# Patient Record
Sex: FEMALE | Race: WHITE | NOT HISPANIC OR LATINO | Employment: OTHER | URBAN - METROPOLITAN AREA
[De-identification: names, ages, dates, MRNs, and addresses within clinical notes are randomized per-mention and may not be internally consistent; named-entity substitution may affect disease eponyms.]

---

## 2023-02-21 LAB
ALANINE AMINOTRANSFERASE (SGPT) (U/L) IN SER/PLAS: 9 U/L (ref 7–45)
ALBUMIN (G/DL) IN SER/PLAS: 4.2 G/DL (ref 3.4–5)
ALKALINE PHOSPHATASE (U/L) IN SER/PLAS: 70 U/L (ref 33–136)
ANION GAP IN SER/PLAS: 14 MMOL/L (ref 10–20)
ASPARTATE AMINOTRANSFERASE (SGOT) (U/L) IN SER/PLAS: 15 U/L (ref 9–39)
BILIRUBIN TOTAL (MG/DL) IN SER/PLAS: 0.6 MG/DL (ref 0–1.2)
CALCIUM (MG/DL) IN SER/PLAS: 9.4 MG/DL (ref 8.6–10.3)
CARBON DIOXIDE, TOTAL (MMOL/L) IN SER/PLAS: 28 MMOL/L (ref 21–32)
CHLORIDE (MMOL/L) IN SER/PLAS: 95 MMOL/L (ref 98–107)
CHOLESTEROL (MG/DL) IN SER/PLAS: 213 MG/DL (ref 0–199)
CHOLESTEROL IN HDL (MG/DL) IN SER/PLAS: 75.1 MG/DL
CHOLESTEROL/HDL RATIO: 2.8
CREATININE (MG/DL) IN SER/PLAS: 0.65 MG/DL (ref 0.5–1.05)
ERYTHROCYTE DISTRIBUTION WIDTH (RATIO) BY AUTOMATED COUNT: 13.4 % (ref 11.5–14.5)
ERYTHROCYTE MEAN CORPUSCULAR HEMOGLOBIN CONCENTRATION (G/DL) BY AUTOMATED: 32.2 G/DL (ref 32–36)
ERYTHROCYTE MEAN CORPUSCULAR VOLUME (FL) BY AUTOMATED COUNT: 91 FL (ref 80–100)
ERYTHROCYTES (10*6/UL) IN BLOOD BY AUTOMATED COUNT: 4.06 X10E12/L (ref 4–5.2)
GFR FEMALE: >90 ML/MIN/1.73M2
GLUCOSE (MG/DL) IN SER/PLAS: 82 MG/DL (ref 74–99)
HEMATOCRIT (%) IN BLOOD BY AUTOMATED COUNT: 36.9 % (ref 36–46)
HEMOGLOBIN (G/DL) IN BLOOD: 11.9 G/DL (ref 12–16)
LDL: 125 MG/DL (ref 0–99)
LEUKOCYTES (10*3/UL) IN BLOOD BY AUTOMATED COUNT: 7.1 X10E9/L (ref 4.4–11.3)
PLATELETS (10*3/UL) IN BLOOD AUTOMATED COUNT: 416 X10E9/L (ref 150–450)
POTASSIUM (MMOL/L) IN SER/PLAS: 4.1 MMOL/L (ref 3.5–5.3)
PROTEIN TOTAL: 7.4 G/DL (ref 6.4–8.2)
SODIUM (MMOL/L) IN SER/PLAS: 133 MMOL/L (ref 136–145)
THYROTROPIN (MIU/L) IN SER/PLAS BY DETECTION LIMIT <= 0.05 MIU/L: 1.21 MIU/L (ref 0.44–3.98)
TRIGLYCERIDE (MG/DL) IN SER/PLAS: 66 MG/DL (ref 0–149)
UREA NITROGEN (MG/DL) IN SER/PLAS: 16 MG/DL (ref 6–23)
VLDL: 13 MG/DL (ref 0–40)

## 2023-04-05 ENCOUNTER — HOSPITAL ENCOUNTER (OUTPATIENT)
Dept: DATA CONVERSION | Facility: HOSPITAL | Age: 73
End: 2023-04-06
Attending: ORTHOPAEDIC SURGERY | Admitting: ORTHOPAEDIC SURGERY
Payer: MEDICARE

## 2023-04-05 DIAGNOSIS — Z79.891 LONG TERM (CURRENT) USE OF OPIATE ANALGESIC: ICD-10-CM

## 2023-04-05 DIAGNOSIS — Z79.82 LONG TERM (CURRENT) USE OF ASPIRIN: ICD-10-CM

## 2023-04-05 DIAGNOSIS — M17.12 UNILATERAL PRIMARY OSTEOARTHRITIS, LEFT KNEE: ICD-10-CM

## 2023-04-05 DIAGNOSIS — M16.11 UNILATERAL PRIMARY OSTEOARTHRITIS, RIGHT HIP: ICD-10-CM

## 2023-04-05 DIAGNOSIS — I73.9 PERIPHERAL VASCULAR DISEASE, UNSPECIFIED (CMS-HCC): ICD-10-CM

## 2023-04-05 DIAGNOSIS — Z79.1 LONG TERM (CURRENT) USE OF NON-STEROIDAL ANTI-INFLAMMATORIES (NSAID): ICD-10-CM

## 2023-04-05 DIAGNOSIS — K21.9 GASTRO-ESOPHAGEAL REFLUX DISEASE WITHOUT ESOPHAGITIS: ICD-10-CM

## 2023-04-05 DIAGNOSIS — I10 ESSENTIAL (PRIMARY) HYPERTENSION: ICD-10-CM

## 2023-04-05 DIAGNOSIS — D50.9 IRON DEFICIENCY ANEMIA, UNSPECIFIED: ICD-10-CM

## 2023-04-05 DIAGNOSIS — E03.9 HYPOTHYROIDISM, UNSPECIFIED: ICD-10-CM

## 2023-04-05 DIAGNOSIS — Z98.51 TUBAL LIGATION STATUS: ICD-10-CM

## 2023-04-05 DIAGNOSIS — E78.5 HYPERLIPIDEMIA, UNSPECIFIED: ICD-10-CM

## 2023-04-05 LAB
ABO GROUP (TYPE) IN BLOOD: NORMAL
ANTIBODY SCREEN: NORMAL
RH FACTOR: NORMAL
SARS-COV-2 RESULT: NOT DETECTED

## 2023-05-31 ENCOUNTER — HOSPITAL ENCOUNTER (OUTPATIENT)
Dept: DATA CONVERSION | Facility: HOSPITAL | Age: 73
End: 2023-06-01
Attending: ORTHOPAEDIC SURGERY | Admitting: ORTHOPAEDIC SURGERY
Payer: MEDICARE

## 2023-05-31 DIAGNOSIS — M87.051 IDIOPATHIC ASEPTIC NECROSIS OF RIGHT FEMUR (MULTI): ICD-10-CM

## 2023-05-31 DIAGNOSIS — M19.90 UNSPECIFIED OSTEOARTHRITIS, UNSPECIFIED SITE: ICD-10-CM

## 2023-05-31 DIAGNOSIS — M25.751 OSTEOPHYTE, RIGHT HIP: ICD-10-CM

## 2023-05-31 DIAGNOSIS — Z79.82 LONG TERM (CURRENT) USE OF ASPIRIN: ICD-10-CM

## 2023-05-31 DIAGNOSIS — I10 ESSENTIAL (PRIMARY) HYPERTENSION: ICD-10-CM

## 2023-05-31 DIAGNOSIS — D50.9 IRON DEFICIENCY ANEMIA, UNSPECIFIED: ICD-10-CM

## 2023-05-31 DIAGNOSIS — I73.9 PERIPHERAL VASCULAR DISEASE, UNSPECIFIED (CMS-HCC): ICD-10-CM

## 2023-05-31 DIAGNOSIS — Z86.19 PERSONAL HISTORY OF OTHER INFECTIOUS AND PARASITIC DISEASES: ICD-10-CM

## 2023-05-31 DIAGNOSIS — Z96.652 PRESENCE OF LEFT ARTIFICIAL KNEE JOINT: ICD-10-CM

## 2023-05-31 DIAGNOSIS — E78.5 HYPERLIPIDEMIA, UNSPECIFIED: ICD-10-CM

## 2023-05-31 DIAGNOSIS — E03.9 HYPOTHYROIDISM, UNSPECIFIED: ICD-10-CM

## 2023-06-27 ENCOUNTER — TELEPHONE (OUTPATIENT)
Dept: PRIMARY CARE | Facility: CLINIC | Age: 73
End: 2023-06-27
Payer: MEDICARE

## 2023-06-27 NOTE — TELEPHONE ENCOUNTER
They called to let us know that her BP has been running 172/97 hr 58 normal rhythm at 10:10 and then 10:15 it was 174/94 and hr 59 normal rhythm they stated that she is not on any meds for her BP and she is not schedule til 8/14 to see vanessa and she had two surgery one was hip & back she in pas has ran like 140/86 6/22, 6/20 150/86 no chest pain , sob, no weakness, and is A system matic home physcial with [facility] and her was jaleesa [phone] this is her number and patient number is [phone]. What would you remanded to do?

## 2023-07-11 ENCOUNTER — LAB (OUTPATIENT)
Dept: LAB | Facility: LAB | Age: 73
End: 2023-07-11
Payer: MEDICARE

## 2023-07-11 ENCOUNTER — OFFICE VISIT (OUTPATIENT)
Dept: PRIMARY CARE | Facility: CLINIC | Age: 73
End: 2023-07-11
Payer: MEDICARE

## 2023-07-11 VITALS
DIASTOLIC BLOOD PRESSURE: 78 MMHG | WEIGHT: 106.4 LBS | SYSTOLIC BLOOD PRESSURE: 138 MMHG | HEIGHT: 62 IN | HEART RATE: 76 BPM | BODY MASS INDEX: 19.58 KG/M2 | OXYGEN SATURATION: 98 % | RESPIRATION RATE: 16 BRPM

## 2023-07-11 DIAGNOSIS — E55.9 VITAMIN D DEFICIENCY: ICD-10-CM

## 2023-07-11 DIAGNOSIS — E53.8 VITAMIN B 12 DEFICIENCY: ICD-10-CM

## 2023-07-11 DIAGNOSIS — E78.5 DYSLIPIDEMIA: ICD-10-CM

## 2023-07-11 DIAGNOSIS — I10 PRIMARY HYPERTENSION: ICD-10-CM

## 2023-07-11 DIAGNOSIS — R73.01 ELEVATED FASTING BLOOD SUGAR: ICD-10-CM

## 2023-07-11 DIAGNOSIS — E03.9 HYPOTHYROIDISM, UNSPECIFIED TYPE: ICD-10-CM

## 2023-07-11 DIAGNOSIS — E03.9 HYPOTHYROIDISM, UNSPECIFIED TYPE: Primary | ICD-10-CM

## 2023-07-11 DIAGNOSIS — R03.0 ELEVATED BLOOD PRESSURE, SITUATIONAL: ICD-10-CM

## 2023-07-11 PROBLEM — I73.9 PVD (PERIPHERAL VASCULAR DISEASE) (CMS-HCC): Status: ACTIVE | Noted: 2023-07-11

## 2023-07-11 PROBLEM — M25.562 LEFT KNEE PAIN: Status: ACTIVE | Noted: 2023-07-11

## 2023-07-11 PROBLEM — R12 HEARTBURN: Status: ACTIVE | Noted: 2023-07-11

## 2023-07-11 PROBLEM — R29.898 DECREASED STRENGTH INVOLVING KNEE JOINT: Status: ACTIVE | Noted: 2023-07-11

## 2023-07-11 PROBLEM — M25.662 DECREASED RANGE OF MOTION OF LEFT KNEE: Status: ACTIVE | Noted: 2023-07-11

## 2023-07-11 PROBLEM — I83.899 VARICOSE VEINS WITH SWELLING: Status: ACTIVE | Noted: 2023-07-11

## 2023-07-11 PROBLEM — R19.5 POSITIVE COLORECTAL CANCER SCREENING USING COLOGUARD TEST: Status: ACTIVE | Noted: 2023-07-11

## 2023-07-11 PROBLEM — R19.4 CHANGE IN BOWEL HABITS: Status: ACTIVE | Noted: 2023-07-11

## 2023-07-11 LAB
ALANINE AMINOTRANSFERASE (SGPT) (U/L) IN SER/PLAS: 9 U/L (ref 7–45)
ALBUMIN (G/DL) IN SER/PLAS: 4.2 G/DL (ref 3.4–5)
ALKALINE PHOSPHATASE (U/L) IN SER/PLAS: 83 U/L (ref 33–136)
ANION GAP IN SER/PLAS: 14 MMOL/L (ref 10–20)
ASPARTATE AMINOTRANSFERASE (SGOT) (U/L) IN SER/PLAS: 11 U/L (ref 9–39)
BILIRUBIN TOTAL (MG/DL) IN SER/PLAS: 0.5 MG/DL (ref 0–1.2)
CALCIDIOL (25 OH VITAMIN D3) (NG/ML) IN SER/PLAS: 54 NG/ML
CALCIUM (MG/DL) IN SER/PLAS: 9 MG/DL (ref 8.6–10.3)
CARBON DIOXIDE, TOTAL (MMOL/L) IN SER/PLAS: 27 MMOL/L (ref 21–32)
CHLORIDE (MMOL/L) IN SER/PLAS: 102 MMOL/L (ref 98–107)
CHOLESTEROL (MG/DL) IN SER/PLAS: 181 MG/DL (ref 0–199)
CHOLESTEROL IN HDL (MG/DL) IN SER/PLAS: 58 MG/DL
CHOLESTEROL/HDL RATIO: 3.1
COBALAMIN (VITAMIN B12) (PG/ML) IN SER/PLAS: 304 PG/ML (ref 211–911)
CREATININE (MG/DL) IN SER/PLAS: 0.68 MG/DL (ref 0.5–1.05)
ERYTHROCYTE DISTRIBUTION WIDTH (RATIO) BY AUTOMATED COUNT: 13.9 % (ref 11.5–14.5)
ERYTHROCYTE MEAN CORPUSCULAR HEMOGLOBIN CONCENTRATION (G/DL) BY AUTOMATED: 30.6 G/DL (ref 32–36)
ERYTHROCYTE MEAN CORPUSCULAR VOLUME (FL) BY AUTOMATED COUNT: 95 FL (ref 80–100)
ERYTHROCYTES (10*6/UL) IN BLOOD BY AUTOMATED COUNT: 3.76 X10E12/L (ref 4–5.2)
GFR FEMALE: >90 ML/MIN/1.73M2
GLUCOSE (MG/DL) IN SER/PLAS: 80 MG/DL (ref 74–99)
HEMATOCRIT (%) IN BLOOD BY AUTOMATED COUNT: 35.9 % (ref 36–46)
HEMOGLOBIN (G/DL) IN BLOOD: 11 G/DL (ref 12–16)
LDL: 105 MG/DL (ref 0–99)
LEUKOCYTES (10*3/UL) IN BLOOD BY AUTOMATED COUNT: 4.9 X10E9/L (ref 4.4–11.3)
PLATELETS (10*3/UL) IN BLOOD AUTOMATED COUNT: 356 X10E9/L (ref 150–450)
POTASSIUM (MMOL/L) IN SER/PLAS: 4.3 MMOL/L (ref 3.5–5.3)
PROTEIN TOTAL: 6.9 G/DL (ref 6.4–8.2)
SODIUM (MMOL/L) IN SER/PLAS: 139 MMOL/L (ref 136–145)
THYROTROPIN (MIU/L) IN SER/PLAS BY DETECTION LIMIT <= 0.05 MIU/L: 0.62 MIU/L (ref 0.44–3.98)
TRIGLYCERIDE (MG/DL) IN SER/PLAS: 91 MG/DL (ref 0–149)
UREA NITROGEN (MG/DL) IN SER/PLAS: 15 MG/DL (ref 6–23)
VLDL: 18 MG/DL (ref 0–40)

## 2023-07-11 PROCEDURE — 1159F MED LIST DOCD IN RCRD: CPT

## 2023-07-11 PROCEDURE — 36415 COLL VENOUS BLD VENIPUNCTURE: CPT

## 2023-07-11 PROCEDURE — 1160F RVW MEDS BY RX/DR IN RCRD: CPT

## 2023-07-11 PROCEDURE — 80053 COMPREHEN METABOLIC PANEL: CPT

## 2023-07-11 PROCEDURE — 84443 ASSAY THYROID STIM HORMONE: CPT

## 2023-07-11 PROCEDURE — 1036F TOBACCO NON-USER: CPT

## 2023-07-11 PROCEDURE — 85027 COMPLETE CBC AUTOMATED: CPT

## 2023-07-11 PROCEDURE — 3075F SYST BP GE 130 - 139MM HG: CPT

## 2023-07-11 PROCEDURE — 80061 LIPID PANEL: CPT

## 2023-07-11 PROCEDURE — 99213 OFFICE O/P EST LOW 20 MIN: CPT

## 2023-07-11 PROCEDURE — 82607 VITAMIN B-12: CPT

## 2023-07-11 PROCEDURE — 82306 VITAMIN D 25 HYDROXY: CPT

## 2023-07-11 PROCEDURE — 3078F DIAST BP <80 MM HG: CPT

## 2023-07-11 PROCEDURE — 83036 HEMOGLOBIN GLYCOSYLATED A1C: CPT

## 2023-07-11 RX ORDER — FERROUS SULFATE 325(65) MG
TABLET ORAL
COMMUNITY
End: 2023-08-14 | Stop reason: ALTCHOICE

## 2023-07-11 RX ORDER — LEVOTHYROXINE SODIUM 50 UG/1
50 TABLET ORAL DAILY
COMMUNITY
End: 2023-09-05 | Stop reason: SDUPTHER

## 2023-07-11 RX ORDER — PNV NO.95/FERROUS FUM/FOLIC AC 28MG-0.8MG
1 TABLET ORAL DAILY
COMMUNITY

## 2023-07-11 RX ORDER — CALCIUM CARBONATE 200(500)MG
1 TABLET,CHEWABLE ORAL DAILY
COMMUNITY

## 2023-07-11 RX ORDER — CHOLECALCIFEROL (VITAMIN D3) 25 MCG
1 TABLET ORAL DAILY
COMMUNITY

## 2023-07-11 RX ORDER — LISINOPRIL 10 MG/1
10 TABLET ORAL DAILY
Qty: 90 TABLET | Refills: 0 | Status: SHIPPED | OUTPATIENT
Start: 2023-07-11 | End: 2023-07-20 | Stop reason: SDUPTHER

## 2023-07-11 RX ORDER — PHENYLPROPANOLAMINE/CLEMASTINE 75-1.34MG
200 TABLET, EXTENDED RELEASE ORAL
COMMUNITY
End: 2023-08-14 | Stop reason: ALTCHOICE

## 2023-07-11 ASSESSMENT — ANXIETY QUESTIONNAIRES
5. BEING SO RESTLESS THAT IT IS HARD TO SIT STILL: NOT AT ALL
GAD7 TOTAL SCORE: 0
3. WORRYING TOO MUCH ABOUT DIFFERENT THINGS: NOT AT ALL
6. BECOMING EASILY ANNOYED OR IRRITABLE: NOT AT ALL
2. NOT BEING ABLE TO STOP OR CONTROL WORRYING: NOT AT ALL
IF YOU CHECKED OFF ANY PROBLEMS ON THIS QUESTIONNAIRE, HOW DIFFICULT HAVE THESE PROBLEMS MADE IT FOR YOU TO DO YOUR WORK, TAKE CARE OF THINGS AT HOME, OR GET ALONG WITH OTHER PEOPLE: NOT DIFFICULT AT ALL
4. TROUBLE RELAXING: NOT AT ALL
1. FEELING NERVOUS, ANXIOUS, OR ON EDGE: NOT AT ALL
7. FEELING AFRAID AS IF SOMETHING AWFUL MIGHT HAPPEN: NOT AT ALL

## 2023-07-11 ASSESSMENT — ENCOUNTER SYMPTOMS
ENDOCRINE NEGATIVE: 1
NEUROLOGICAL NEGATIVE: 1
GASTROINTESTINAL NEGATIVE: 1
CARDIOVASCULAR NEGATIVE: 1
LOSS OF SENSATION IN FEET: 0
OCCASIONAL FEELINGS OF UNSTEADINESS: 0
RESPIRATORY NEGATIVE: 1
EYES NEGATIVE: 1
CONSTITUTIONAL NEGATIVE: 1
MUSCULOSKELETAL NEGATIVE: 1
PSYCHIATRIC NEGATIVE: 1
ALLERGIC/IMMUNOLOGIC NEGATIVE: 1
DEPRESSION: 0
HEMATOLOGIC/LYMPHATIC NEGATIVE: 1

## 2023-07-11 ASSESSMENT — PATIENT HEALTH QUESTIONNAIRE - PHQ9
SUM OF ALL RESPONSES TO PHQ9 QUESTIONS 1 AND 2: 0
2. FEELING DOWN, DEPRESSED OR HOPELESS: NOT AT ALL
1. LITTLE INTEREST OR PLEASURE IN DOING THINGS: NOT AT ALL

## 2023-07-11 NOTE — PATIENT INSTRUCTIONS
Follow up with PCP at scheduled appointment.    Please complete blood work before PCP appointment.     Lisinopril 10mg daily for HTN.

## 2023-07-11 NOTE — PROGRESS NOTES
"Subjective   Patient ID: Oralia Fam is a 72 y.o. female who presents for Hypertension.    A 72 year old female arrives to the clinic with chief complaint of concern for elevated BP. The patient reports having elevated blood pressure with episodes of dizziness. She denies any falls or trauma. She was educated by her previous PCP to monitor her BP until she establishes with a new primary care provider however missed her last appointment.  She reports that her blood pressure in the office today is elevated.  She did bring a blood pressure reading log would like to review them.  Other than this, the patient denies any chest pain, shortness of breath, diarrhea, fevers, chills, head pain, COVID-like symptoms.         Review of Systems   Constitutional: Negative.    HENT: Negative.     Eyes: Negative.    Respiratory: Negative.     Cardiovascular: Negative.    Gastrointestinal: Negative.    Endocrine: Negative.    Genitourinary: Negative.    Musculoskeletal: Negative.    Skin: Negative.    Allergic/Immunologic: Negative.    Neurological: Negative.    Hematological: Negative.    Psychiatric/Behavioral: Negative.     All other systems reviewed and are negative.      Objective   /78   Pulse 76   Resp 16   Ht 1.575 m (5' 2\")   Wt 48.3 kg (106 lb 6.4 oz)   SpO2 98%   BMI 19.46 kg/m²     Physical Exam  Vitals and nursing note reviewed.   Constitutional:       Appearance: Normal appearance.   HENT:      Head: Normocephalic and atraumatic.      Right Ear: Tympanic membrane normal.      Left Ear: Tympanic membrane normal.      Nose: Nose normal.      Mouth/Throat:      Mouth: Mucous membranes are moist.      Pharynx: Oropharynx is clear.   Eyes:      Extraocular Movements: Extraocular movements intact.      Conjunctiva/sclera: Conjunctivae normal.      Pupils: Pupils are equal, round, and reactive to light.   Cardiovascular:      Rate and Rhythm: Normal rate and regular rhythm.   Pulmonary:      Effort: Pulmonary " effort is normal.      Breath sounds: Normal breath sounds.   Abdominal:      General: Bowel sounds are normal.      Palpations: Abdomen is soft.   Musculoskeletal:         General: Normal range of motion.      Cervical back: Normal range of motion and neck supple.   Skin:     General: Skin is warm.      Capillary Refill: Capillary refill takes less than 2 seconds.   Neurological:      General: No focal deficit present.      Mental Status: She is alert and oriented to person, place, and time. Mental status is at baseline.   Psychiatric:         Mood and Affect: Mood normal.         Behavior: Behavior normal.         Thought Content: Thought content normal.         Judgment: Judgment normal.         Assessment/Plan   Problem List Items Addressed This Visit       Dyslipidemia    Relevant Orders    Lipid Panel    Elevated blood pressure, situational    Relevant Orders    Comprehensive Metabolic Panel    CBC    Hypothyroid - Primary    Relevant Orders    TSH with reflex to Free T4 if abnormal    Primary hypertension     Other Visit Diagnoses       Vitamin B 12 deficiency        Relevant Orders    Vitamin B12    Vitamin D deficiency        Relevant Orders    Vitamin D, Total    Elevated fasting blood sugar        Relevant Orders    Hemoglobin A1C          Please complete blood work before PCP appointment.    Lisinopril 10mg daily for elevated BP. Bring new BP log to new PCP appointment. Call the office in 2 weeks for medication review.    I also advised you to follow low fat diet and exercise for at least 30 minutes daily.    Anticipatory guidance, age appropriate vaccines, screening exams, health promotion and prevention discussed.    This document was generated using the assistance of voice recognition software. If there are any errors of spelling, grammar, syntax, or meaning; please feel free to contact me directly for clarification.

## 2023-07-12 LAB
ESTIMATED AVERAGE GLUCOSE FOR HBA1C: 105 MG/DL
HEMOGLOBIN A1C/HEMOGLOBIN TOTAL IN BLOOD: 5.3 %

## 2023-07-20 ENCOUNTER — TELEPHONE (OUTPATIENT)
Dept: PRIMARY CARE | Facility: CLINIC | Age: 73
End: 2023-07-20
Payer: MEDICARE

## 2023-07-20 DIAGNOSIS — R42 VERTIGO: Primary | ICD-10-CM

## 2023-07-20 DIAGNOSIS — I10 PRIMARY HYPERTENSION: ICD-10-CM

## 2023-07-20 RX ORDER — MECLIZINE HYDROCHLORIDE 25 MG/1
25 TABLET ORAL 3 TIMES DAILY PRN
Qty: 30 TABLET | Refills: 11 | Status: SHIPPED | OUTPATIENT
Start: 2023-07-20 | End: 2023-08-14 | Stop reason: ALTCHOICE

## 2023-07-20 RX ORDER — LISINOPRIL 10 MG/1
10 TABLET ORAL DAILY
Qty: 90 TABLET | Refills: 0 | Status: SHIPPED | OUTPATIENT
Start: 2023-07-20 | End: 2023-10-09 | Stop reason: SDUPTHER

## 2023-07-20 NOTE — TELEPHONE ENCOUNTER
Meclizine sent for vertigo and dizziness.    Lisinopril 10mg working well for HTN.    Pt is satisfied. No need for call back. Thanks!    -ALEX

## 2023-07-20 NOTE — TELEPHONE ENCOUNTER
Recently put on Lisinopril 10 mg    She was to call you in a week & asking if she could speak to you  ([phone])

## 2023-08-08 PROBLEM — M17.12 PRIMARY OSTEOARTHRITIS OF LEFT KNEE: Status: ACTIVE | Noted: 2023-08-08

## 2023-08-08 PROBLEM — M25.551 RIGHT HIP PAIN: Status: ACTIVE | Noted: 2023-08-08

## 2023-08-08 PROBLEM — M87.051 AVASCULAR NECROSIS OF BONE OF RIGHT HIP (MULTI): Status: ACTIVE | Noted: 2023-08-08

## 2023-08-14 ENCOUNTER — OFFICE VISIT (OUTPATIENT)
Dept: PRIMARY CARE | Facility: CLINIC | Age: 73
End: 2023-08-14
Payer: MEDICARE

## 2023-08-14 VITALS
SYSTOLIC BLOOD PRESSURE: 126 MMHG | HEART RATE: 58 BPM | DIASTOLIC BLOOD PRESSURE: 80 MMHG | HEIGHT: 63 IN | WEIGHT: 110 LBS | BODY MASS INDEX: 19.49 KG/M2

## 2023-08-14 DIAGNOSIS — E78.5 DYSLIPIDEMIA: ICD-10-CM

## 2023-08-14 DIAGNOSIS — R60.0 EDEMA OF LEFT LOWER LEG DUE TO PERIPHERAL VENOUS INSUFFICIENCY: ICD-10-CM

## 2023-08-14 DIAGNOSIS — K21.9 GASTROESOPHAGEAL REFLUX DISEASE WITHOUT ESOPHAGITIS: ICD-10-CM

## 2023-08-14 DIAGNOSIS — Z96.652 HISTORY OF LEFT KNEE REPLACEMENT: ICD-10-CM

## 2023-08-14 DIAGNOSIS — H81.12 BPPV (BENIGN PAROXYSMAL POSITIONAL VERTIGO), LEFT: ICD-10-CM

## 2023-08-14 DIAGNOSIS — I87.2 CHRONIC VENOUS INSUFFICIENCY OF LOWER EXTREMITY: ICD-10-CM

## 2023-08-14 DIAGNOSIS — I10 PRIMARY HYPERTENSION: Primary | ICD-10-CM

## 2023-08-14 DIAGNOSIS — E03.9 ACQUIRED HYPOTHYROIDISM: ICD-10-CM

## 2023-08-14 DIAGNOSIS — Z96.641 HISTORY OF RIGHT HIP REPLACEMENT: ICD-10-CM

## 2023-08-14 DIAGNOSIS — I87.2 EDEMA OF LEFT LOWER LEG DUE TO PERIPHERAL VENOUS INSUFFICIENCY: ICD-10-CM

## 2023-08-14 PROBLEM — R12 HEARTBURN: Status: RESOLVED | Noted: 2023-07-11 | Resolved: 2023-08-14

## 2023-08-14 PROBLEM — M25.662 DECREASED RANGE OF MOTION OF LEFT KNEE: Status: RESOLVED | Noted: 2023-07-11 | Resolved: 2023-08-14

## 2023-08-14 PROBLEM — M87.051 AVASCULAR NECROSIS OF BONE OF RIGHT HIP (MULTI): Status: RESOLVED | Noted: 2023-08-08 | Resolved: 2023-08-14

## 2023-08-14 PROBLEM — M25.562 LEFT KNEE PAIN: Status: RESOLVED | Noted: 2023-07-11 | Resolved: 2023-08-14

## 2023-08-14 PROBLEM — R73.01 ABNORMAL FASTING GLUCOSE: Status: RESOLVED | Noted: 2023-07-11 | Resolved: 2023-08-14

## 2023-08-14 PROBLEM — R19.4 CHANGE IN BOWEL HABITS: Status: RESOLVED | Noted: 2023-07-11 | Resolved: 2023-08-14

## 2023-08-14 PROBLEM — R19.5 POSITIVE COLORECTAL CANCER SCREENING USING COLOGUARD TEST: Status: RESOLVED | Noted: 2023-07-11 | Resolved: 2023-08-14

## 2023-08-14 PROBLEM — M17.12 PRIMARY OSTEOARTHRITIS OF LEFT KNEE: Status: RESOLVED | Noted: 2023-08-08 | Resolved: 2023-08-14

## 2023-08-14 PROBLEM — R29.898 DECREASED STRENGTH INVOLVING KNEE JOINT: Status: RESOLVED | Noted: 2023-07-11 | Resolved: 2023-08-14

## 2023-08-14 PROBLEM — M25.551 RIGHT HIP PAIN: Status: RESOLVED | Noted: 2023-08-08 | Resolved: 2023-08-14

## 2023-08-14 PROBLEM — I83.899 VARICOSE VEINS WITH SWELLING: Status: RESOLVED | Noted: 2023-07-11 | Resolved: 2023-08-14

## 2023-08-14 PROBLEM — R03.0 ELEVATED BLOOD PRESSURE, SITUATIONAL: Status: RESOLVED | Noted: 2023-07-11 | Resolved: 2023-08-14

## 2023-08-14 PROCEDURE — 1159F MED LIST DOCD IN RCRD: CPT | Performed by: INTERNAL MEDICINE

## 2023-08-14 PROCEDURE — 3079F DIAST BP 80-89 MM HG: CPT | Performed by: INTERNAL MEDICINE

## 2023-08-14 PROCEDURE — 99214 OFFICE O/P EST MOD 30 MIN: CPT | Performed by: INTERNAL MEDICINE

## 2023-08-14 PROCEDURE — 3074F SYST BP LT 130 MM HG: CPT | Performed by: INTERNAL MEDICINE

## 2023-08-14 PROCEDURE — 1036F TOBACCO NON-USER: CPT | Performed by: INTERNAL MEDICINE

## 2023-08-14 PROCEDURE — 1160F RVW MEDS BY RX/DR IN RCRD: CPT | Performed by: INTERNAL MEDICINE

## 2023-08-14 RX ORDER — OMEPRAZOLE 20 MG/1
20 CAPSULE, DELAYED RELEASE ORAL DAILY
Qty: 30 CAPSULE | Refills: 5 | Status: SHIPPED | OUTPATIENT
Start: 2023-08-14 | End: 2024-02-14

## 2023-08-14 ASSESSMENT — ANXIETY QUESTIONNAIRES
5. BEING SO RESTLESS THAT IT IS HARD TO SIT STILL: NOT AT ALL
2. NOT BEING ABLE TO STOP OR CONTROL WORRYING: NOT AT ALL
1. FEELING NERVOUS, ANXIOUS, OR ON EDGE: NOT AT ALL
3. WORRYING TOO MUCH ABOUT DIFFERENT THINGS: NOT AT ALL
7. FEELING AFRAID AS IF SOMETHING AWFUL MIGHT HAPPEN: NOT AT ALL
GAD7 TOTAL SCORE: 0
IF YOU CHECKED OFF ANY PROBLEMS ON THIS QUESTIONNAIRE, HOW DIFFICULT HAVE THESE PROBLEMS MADE IT FOR YOU TO DO YOUR WORK, TAKE CARE OF THINGS AT HOME, OR GET ALONG WITH OTHER PEOPLE: NOT DIFFICULT AT ALL
4. TROUBLE RELAXING: NOT AT ALL
6. BECOMING EASILY ANNOYED OR IRRITABLE: NOT AT ALL

## 2023-08-14 ASSESSMENT — COLUMBIA-SUICIDE SEVERITY RATING SCALE - C-SSRS
2. HAVE YOU ACTUALLY HAD ANY THOUGHTS OF KILLING YOURSELF?: NO
1. IN THE PAST MONTH, HAVE YOU WISHED YOU WERE DEAD OR WISHED YOU COULD GO TO SLEEP AND NOT WAKE UP?: NO
6. HAVE YOU EVER DONE ANYTHING, STARTED TO DO ANYTHING, OR PREPARED TO DO ANYTHING TO END YOUR LIFE?: NO

## 2023-08-14 ASSESSMENT — PATIENT HEALTH QUESTIONNAIRE - PHQ9
SUM OF ALL RESPONSES TO PHQ9 QUESTIONS 1 AND 2: 0
1. LITTLE INTEREST OR PLEASURE IN DOING THINGS: NOT AT ALL
2. FEELING DOWN, DEPRESSED OR HOPELESS: NOT AT ALL

## 2023-08-14 ASSESSMENT — ENCOUNTER SYMPTOMS
OCCASIONAL FEELINGS OF UNSTEADINESS: 0
LOSS OF SENSATION IN FEET: 0
DEPRESSION: 0

## 2023-08-14 NOTE — PROGRESS NOTES
"Blood pressure controlled well on lisinopril 10 mg daily continue Subjective   Reason for Visit: Oralia Fam is an 72 y.o. female here for a fu visit.     Past Medical, Surgical, and Family History reviewed and updated in chart.    Reviewed all medications by prescribing practitioner or clinical pharmacist (such as prescriptions, OTCs, herbal therapies and supplements) and documented in the medical record.    HPI    Patient Care Team:  Buddy Blas DO as PCP - General (Internal Medicine)  Jl Lara MD as PCP - [employer] PCP     Review of Systems   All other systems reviewed and are negative.      Objective   Vitals:  /80 (BP Location: Right arm, Patient Position: Sitting)   Pulse 58   Ht 1.6 m (5' 3\")   Wt 49.9 kg (110 lb)   BMI 19.49 kg/m²       Physical Exam  Vitals and nursing note reviewed.   Constitutional:       General: She is not in acute distress.     Appearance: Normal appearance. She is well-developed. She is not toxic-appearing.   HENT:      Head: Normocephalic and atraumatic.      Right Ear: Tympanic membrane and external ear normal.      Left Ear: Tympanic membrane and external ear normal.      Nose: Nose normal.      Mouth/Throat:      Mouth: Mucous membranes are moist.      Pharynx: Oropharynx is clear. No oropharyngeal exudate or posterior oropharyngeal erythema.      Tonsils: No tonsillar exudate. 2+ on the right. 2+ on the left.   Eyes:      Extraocular Movements: Extraocular movements intact.      Conjunctiva/sclera: Conjunctivae normal.   Cardiovascular:      Rate and Rhythm: Normal rate and regular rhythm.      Pulses: Normal pulses.      Heart sounds: Normal heart sounds. No murmur heard.  Pulmonary:      Effort: Pulmonary effort is normal.      Breath sounds: Normal breath sounds.   Abdominal:      General: Abdomen is flat. Bowel sounds are normal.      Palpations: Abdomen is soft.   Musculoskeletal:      Cervical back: Neck supple. "   Lymphadenopathy:      Cervical: No cervical adenopathy.   Skin:     General: Skin is warm and dry.      Findings: No rash.   Neurological:      Mental Status: She is alert. Mental status is at baseline.   Psychiatric:         Mood and Affect: Mood normal.         Behavior: Behavior normal.         Thought Content: Thought content normal.         Judgment: Judgment normal.         Assessment/Plan   Problem List Items Addressed This Visit       Dyslipidemia    Current Assessment & Plan     Recent lipid panel favorable with LDL cholesterol of 105 HDL 58         Relevant Orders    Vascular US lower extremity venous duplex bilateral    Follow Up In [facility] - PCP - Established    Hypothyroid    Current Assessment & Plan     Clinically euthyroid continue levothyroxine 50 mcg daily         Relevant Orders    Vascular US lower extremity venous duplex bilateral    Follow Up [facility] - PCP - Established    Primary hypertension - Primary    Current Assessment & Plan     Blood pressure controlled well on lisinopril 10 mg daily         Relevant Orders    Vascular US lower extremity venous duplex bilateral    Follow Up [facility] - PCP - Established    BPPV (benign paroxysmal positional vertigo), left    Current Assessment & Plan     Stable transient continue with vestibular exercises         Relevant Orders    Vascular US lower extremity venous duplex bilateral    Follow Up [facility] - PCP - Established    Gastroesophageal reflux disease without esophagitis    Current Assessment & Plan     Refilled omeprazole 20 mg daily recent colonoscopy for positive Cologuard was unremarkable okay for 6-month reevaluation         Relevant Medications    omeprazole (PriLOSEC) 20 mg DR capsule    Other Relevant Orders    Vascular US lower extremity venous duplex bilateral    Follow Up In [facility] - PCP - Established    Chronic venous insufficiency of lower extremity     Current Assessment & Plan     Referred to vascular ultrasound of veins to evaluate for venous stasis and reflux amenable to stent use of compression hose on a daily basis to treat chronic varicosities of the lower legs         History of right hip replacement    Current Assessment & Plan     Continue postoperative care plan         Relevant Orders    Vascular US lower extremity venous duplex bilateral    Follow Up [facility] - PCP - Established    History of left knee replacement    Current Assessment & Plan     Continue postoperative care plan         Relevant Orders    Vascular US lower extremity venous duplex bilateral    Follow Up [facility] - PCP - Established     Other Visit Diagnoses       Edema of left lower leg due to peripheral venous insufficiency        Relevant Orders    Vascular US lower extremity venous duplex bilateral

## 2023-08-14 NOTE — ASSESSMENT & PLAN NOTE
Refilled omeprazole 20 mg daily recent colonoscopy for positive Cologuard was unremarkable okay for 6-month reevaluation

## 2023-08-14 NOTE — PROGRESS NOTES
"Subjective   Patient ID: Oralia Fam is a 72 y.o. female who presents for Transfer from Dr. Lara .    HPI     Review of Systems    Objective   /80 (BP Location: Right arm, Patient Position: Sitting)   Pulse 58   Ht 1.6 m (5' 3\")   Wt 49.9 kg (110 lb)   BMI 19.49 kg/m²     Physical Exam    Assessment/Plan          "

## 2023-08-14 NOTE — ASSESSMENT & PLAN NOTE
Referred to vascular ultrasound of veins to evaluate for venous stasis and reflux amenable to stent use of compression hose on a daily basis to treat chronic varicosities of the lower legs

## 2023-09-05 ENCOUNTER — TELEPHONE (OUTPATIENT)
Dept: PRIMARY CARE | Facility: CLINIC | Age: 73
End: 2023-09-05
Payer: MEDICARE

## 2023-09-05 DIAGNOSIS — E03.9 ACQUIRED HYPOTHYROIDISM: ICD-10-CM

## 2023-09-05 RX ORDER — LEVOTHYROXINE SODIUM 50 UG/1
50 TABLET ORAL DAILY
Qty: 90 TABLET | Refills: 3 | Status: SHIPPED | OUTPATIENT
Start: 2023-09-05

## 2023-09-05 NOTE — TELEPHONE ENCOUNTER
Rx Refill Request Telephone Encounter    Name:  Oralia Fam  :  [id number]  Medication Name:  Levothyroxine  50 mcg          Specific Pharmacy location:  [facility]  Patient only has one pill left.

## 2023-09-06 VITALS — HEIGHT: 62 IN | WEIGHT: 105.82 LBS | BODY MASS INDEX: 19.47 KG/M2

## 2023-09-07 VITALS — BODY MASS INDEX: 20.28 KG/M2 | WEIGHT: 110.23 LBS | HEIGHT: 62 IN

## 2023-09-14 NOTE — DISCHARGE SUMMARY
Send Summary:   Discharge Summary Providers:  Provider Role Provider Name   · Primary Jl Lara       Note Recipients: Jl Lara MD       Discharge:    Summary:   Admission Date: .05-Apr-2023 10:21:00   Discharge Date: 06-Apr-2023   Attending Physician at Discharge: Rupal Renee   Admission Reason: L TKA (1)   Final Discharge Diagnoses: Status post total left  knee replacement   Procedures: Date: 05-Apr-2023 13:44:00  Procedure Name: 1. LEFT TOTAL KNEE ARTHROPLASTY   Condition at Discharge: Satisfactory   Disposition at Discharge: .Home   Hospital Course:    ORTHOPEDIC SURGERY DISCHARGE SUMMARY    Attending Physician: Dr Rupal Renee   Reason for Hospitalization: Elective total knee arthroplasty     Admitting Diagnosis: left knee degenerative joint disease  Discharge Diagnosis: Same as admitting     Operations During Hospitalization: left Total knee arthroplasty     Consultations: Physical Therapy, Case Management     Hospital Course:  This patient presented to admitting provider as an outpatient for knee osteoarthritis. It was determined that they would benefit from surgery in the form of a knee replacement. The procedure, its risks, benefits, and potential complications were discussed  in detail with the patient prior to surgery. Understanding of all topics was conveyed by the patient, and consent was given for surgery. The patient was electively admitted for surgery. Surgery was scheduled and they underwent a knee replacement.  The  procedure was tolerated well and they were sent to the post operative recovery room in stable condition, where they also did well. They were subsequently sent to their hospital room for postoperative management. Once on the floor their postoperative course  was unremarkable and they did well.  Their diet was advanced and well tolerated. Their pain was well controlled on oral pain meds; this was eventually transitioned to oral medication alone prior to discharge.   They worked with Physical and Occupational  Therapy starting on POD# 0 who recommended they be discharged home. They remained afebrile with stable vital signs throughout her stay. Complete and comprehensive discharge instructions were provided to the patient as well as necessary prescriptions.  The patient had no further questions and was advised to call with any questions, concerns, or problems.     Patient was hemodynamically stable postoperatively. Discharge Antibiotics: Prior to surgery the patient was treated with antibiotics and continued with antibiotics 24 hours postoperatively     Transfers: PACU and then to the hospital surgical floor when PACU criteria was met.  Complications: Continued throughout the hospital course without complications.     PHYSICAL EXAM  GENERAL: A/Ox3, NAD. Appears healthy, well nourished  PSYCHIATRIC: Mood stable, appropriate memory recall  EYES: EOM intact, no scleral icterus  CARDIAC: regular rate  LUNGS: Breathing non-labored  SKIN: no erythema, rashes, or ecchymoses     MUSCULOSKELETAL:  Laterality:   Operative Knee Exam  - ROM, Extension: Full  - Dressing: clean and dry  - Negative homans  - Compartments soft  - Grossly NVI Distally on operative leg     NEUROVASCULAR:  - Neurovascular Status: sensation intact to light touch distally  - Capillary refill brisk at extremities, Bilateral dorsalis pedis pulse 2+        Discharge Information:    and Continuing Care:   Lab Results - Pending:    None  Radiology Results - Pending: None   Discharge Instructions:    Additional Orders:           Additional Instructions:   Rupal Renee DO  Phone: [phone] - [facility]     Postoperative Instructions: TOTAL KNEE AND PARTIAL KNEE ARTHROPLASTY    The following is a general guide and may be applied to most patients that go home from the hospital after surgery. If you go to a rehab facility the timeline may deviate a little. Please do not hesitate to call our office for any  questions or additional  guidance. We will work with you to get the best experience from your new joint replacement.     WEEK 1  Relax?Don?t Overdo it!  - WEIGHT BEARING: As tolerated, unless otherwise specified  - Get up once an hour for small activities. (get a drink, go to the bathroom, etc.)  - Keep the surgical site iced and elevated above your heart, if possible, while you are resting.  - You will have some light exercises given to you from the physical therapist in the hospital. Work diligently on your range of motion.    DO NOT place a pillow under the knee for comfort  DO NOT sleep or rest in a recliner if you are able to get in your bed  DO NOT wait until you start therapy to bend the knee.  The sooner the better! (work within your pain tolerance).    All of this may sound scary but knees can get stiff easily and we want you to get your range of motion back as quickly as possible!  SWELLING AND BRUISING  BRUISING AND SWELLING AFTER SURGERY IS NORMAL. As the patient becomes more mobile the bruising and swelling will begin to migrate towards the ankle and foot and is usually noticed toward the end of the day.  WEEK 2-3  You will have a prescription for physical therapy given to you or electronically prescribed and you should start outpatient therapy 7-10 days after your operation. Be sure to do the home exercises on the days you are not attending physical therapy.    - Continue to progress walking as tolerated.   - Continue to work on range of motion exercises at home with a goal of 0-120 degrees by 12 weeks post operative  - Continue to use ice for soreness on the surgical site  - You may wean from your walker to a cane when you feel safe and comfortable to do so. Your physical therapist will also be helpful with progressing your assistive device.   - Be careful with bending and twisting - If it hurts, stop!!    FOLLOW UP  - Your first post-operative visit with Dr. Rupal Renee should be scheduled for 2  WEEKS after surgery.  - You do not need new xrays for this visit  - Don?t be nervous! This visit is to see how you are doing and make sure your incision is healing nicely.       POSTOPERATIVE MEDICATIONS    PAIN MEDICATION    _______ Oxycodone  ? Oxycodone has been prescribed for post-operative pain control. Take one 5 mg tablet every 6 hours for pain. Alternate with Tramadol. See medication example sheet. This medication will only be refilled ONCE every 7 days for a period of 6 weeks.  After 6 weeks, you will transition to acetaminophen (Tylenol) and over -the- counter anti-inflammatories such as Ibuprofen, Advil or Aleve in conjunction with ICE.   ? Side effects may be constipation and nausea, vomiting, sleepiness, dizziness, lightheadedness, headache, blurred vision, dry mouth sweating, itching (if you have itching, over-the -counter Benadryl can be used as needed).  ? You may NOT operate a motor vehicle while taking this medication or have been cleared by your surgeon or PA.     ________ Tramadol  ? Tramadol has been prescribed for post-operative pain control. Take one 50 mg tablet every 6 hours for pain. Alternate with Oxycodone. See medication example sheet. This medication will only be refilled ONCE every 7 days for a period of 6  weeks. After 6 weeks, you will transition to acetaminophen (Tylenol) and over- the- counter anti-inflammatories such as Ibuprofen, Advil or Aleve in conjunction with ICE.   ? Side effects may be constipation and nausea, vomiting, sleepiness, dizziness, lightheadedness, headache, blurred vision, dry mouth, sweating, itching (if you have itching, over-the -counter Benadryl can be used as needed).  ? You may NOT operate a motor vehicle while taking this medication or have been cleared by your surgeon or PA.     _________ Celecoxib (Celebrex) or Meloxicam (Mobic)  ? One of these will be prescribed (if you are able to take it) as an adjunct anti-inflammatory to assist in pain control.  Take one twice daily for 4 weeks. See medication example sheet. You will not receive refills on this medication.  ? Side effects may include nausea or upset stomach    _________ Acetaminophen (Tylenol)  ? Acetaminophen has been prescribed as an adjunct for pain control. Take two 500 mg tablet every 6-8 hours for 4 weeks. See medication example sheet. No refills will be given after initial prescription.  ? Side effects may include nausea, heartburn, drowsiness, and headache.    _________Cyclobenzaprine (Flexeril)  ? This is a muscle relaxer that will be prescribed   ? Take this AS NEEDED per instructions on bottle  ? This will be only prescribed once and is available to help with muscle spasms. There will be no refills of this medication    BLOOD THINNER    __________ Aspirin or Other Blood Thinner  ? Aspirin or another medication has been prescribed as a blood thinner to prevent blood clots in your leg or lungs. Take as prescribed on the bottle for 4 weeks. You will not receive a refill on this medication.  ? Do not take this medication if you are on another blood thinner.    ANTI NAUSEA    __________ Pantoprazole  ? Pantoprazole has been prescribed to help with nausea and protect your stomach while taking pain medication. Take one 40 mg tablet once daily for 4 weeks. You will not receive a refill on this medication.    ANTIBIOTIC    ? If you are deemed ?high risk? for infection after surgery and antibiotic will be prescribed. Please take this as directed for one week.     STOOL SOFTENERS    __________ Senna  ? Post-operative constipation can result due to a combination of inactivity, anesthesia and pain medication. To help prevent this, you should increase your water and fiber intake. Physical activity such as walking will also help stimulate the  bowels.   ? Senna has been prescribed to help with constipation while on Oxycodone and Tramadol. Take one tablet twice daily for 4 weeks. No refills will be given.  ? You  may also take Miralax in combination with Senna to prevent constipation.  This can be purchased over the counter at your local pharmacy.  Take as instructed on the bottle.     Medication Refills - [phone]    If you need to request a medication refill, calls can be taken between Monday through Friday, 8am-1pm.  Any calls received outside of this timeframe will be handled on the next business day.  Medication requests received on Saturday or Sunday will be  handled on Monday.    Per State and Institutional policy, pain medications can only be refilled every 7 days for six weeks following surgery.    Prescription refills will be placed upon request, if there are any issues we will contact you accordingly.  We are unable to place follow up calls to confirm receipt of refill requests.  Please follow up with your pharmacy to verify that your refill has  been sent and is ready for .    ICE  ? You have been prescribed to ice your total joint at a minimum of twice per hour for 20 minutes while awake during the first 6 weeks after surgery if you are using ice packs. This will help with pain control.  ? If you are using an ice machine, please follow ice machine instructions.    WOUND CARE    ? You will have an ace wrap on your leg put on during surgery. This compression wrap is for comfort and may be removed at any time. You may continue to use compression throughout your recovery if this is comfortable for you.  ? You have a waterproof bandage on your wound and may shower with this on. The waterproof bandage is to remain in place for 7 days. You may remove it yourself. You may leave your incision open to air after the bandage has been removed.    ? Under your waterproof bandage you have a mesh and glue dressin in place. Do not peel this off. This will be on for 3-4 weeks. You may trim the edges as they peel off on their own. You can continue to shower with this on. Let the water run  freely over your incision when  showering and do not scrub.     ? DO NOT soak your incision in a bath, hot tub, pool or pond/lake for a minimum of 3 weeks following your surgery.    ? DO NOT use lotions, creams, ointments on your wound for a minimum of 3 weeks following your surgery. At that time you may use vitamin E to assist with softening of your incision.    DENTAL VISITS  It is recommended that you avoid any elective dental work (cleaning, whitening, etc) for 3 months after your surgery. In case of a dental emergency (cavity, root canal) within the 3 month postoperative period you should be pre-medicated with antibiotics.  Please call us before any dental work so we can provide you with antibiotic coverage.     After 3 months, most healthy individuals do not require antibiotics for dental visits.    ?High Risk? patients (chemotherapy, history of transplant, immunocompromised, rheumatoid arthritis) will need antibiotics prior to dental work.  If you think you may be in this category please call the office for assistance.    EMERGENCIES  When to contact our office immediately:  ? Fever >101.5 for at least 48 hours after surgery or chills.  ? Excessive bleeding from incision(s). A small amount of drainage is normal and expected.  ? Signs of infection of incision(s)-excessive drainage that is soaking through your dressing (especially if it is pus-like), redness that is spreading out from the edges of your incision, or increased warmth around the area.  ? Excruciating pain for which the pain medication is not helping.  ? Severe calf pain.  ? Go directly to the emergency room or call 911, if you are experiencing chest pain or difficulty breathing.    RESTARTING HOME MEDICATIONS  ? You may restart your home medications the following day after your surgery UNLESS you have been given alternate instructions.    DIET  ? Resume your normal diet after surgery. If you are on a specific type of diet for your condition, resume that instead.      Total Joint  Replacement Cold Therapy Recommendations    Cold therapy devices can be used before and after surgery to assist in comfort and help to reduce pain and swelling.  These devices differ from ice or ice packs whereas the mechanism circulates water through tubing and a pad to provide longer periods  of cold therapy to the desired site.  While in the hospital, you can use your cold devices around the clock for optimal comfort.  We recommend using cold therapy after working with therapy or completing exercises on your own.  Once you are discharged  home, there is no set schedule in which you must follow while using cold therapy.  Below are a few points to remember when using a cold therapy device:    ? Read the ?s instructions prior to first the use.  ? Follow instructions for filling the cooler (water first, then ice).  ? Always make sure there is a layer of protection between the cold pad and your skin  o Clothing, Towel, Ace Bandage, etc.  ? Allow the device to circulate cold water throughout the pad prior wrapping the pad (approximately 10 minutes).  ? Place the pad appropriately to accommodate your needs and use the wraps provided to secure the pad to your body.  ? During waking hours, remove the cold pad every 1-2 hours to perform a skin check  o Detach the pad from the cooler and ambulate at least once every hour  ? After removing the pad, allow at least 30 minutes before resuming cold therapy  ? You may wear the cold therapy device during periods of sleep including overnight  o   If you wake up during the night, you can check the skin at this time.  You do not need to   wake up specifically to perform skin checks.  ? Empty the cooler and pad when device is not in use.  ? Follow ?s instructions for cleaning your cold therapy device.    OFFICE LOCATIONS    Location 1: [address], [address]  Office Number: [phone]    Location 2: [place] [address]  [address]  [facility], [address]  Office Number: [phone]    Location 3: [facility]  [address] [place] [address]  Office Number: [phone]      Rupal eRnee DO  Joint Replacement and Adult Reconstructive Surgery  [facility]      Home Care Certification:           Home Care Agency:    Home Team [phone]          Skilled Disciplines Ordered:   PT,  OT    Home Care Services:           Home Care Skilled Service:   Rehab (PT/OT/SP eval and treat)    Discharge Medications: Home Medication   Vitamin B12 100 mcg oral tablet - 1 tab(s) orally once a day  levothyroxine 50 mcg (0.05 mg) oral tablet - 1 tab(s) oral once a day  omeprazole 40 mg oral delayed release capsule - 1 cap(s) oral once a day  Eye Multivitamin oral tablet - 1 tab(s) oral once a day  acetaminophen 500 mg oral tablet - 2 tab(s) orally every 6 to 8 hours  for pain   aspirin 81 mg oral tablet, chewable - 1 tab(s) chewed 2 times a day for four weeks to prevent the formation of blood clots  CeleBREX 100 mg oral capsule - 1 cap(s) orally 2 times a day for pain, inflammation, swelling  cyclobenzaprine 5 mg oral tablet - 1 tab(s) orally 3 times a day as needed for muscle spasms  oxyCODONE 5 mg oral capsule - 1 cap(s) orally every 6 hours as needed for severe pain  senna 8.6 mg oral tablet - 1 tab(s) orally 2 times a day as needed to prevent constipation   traMADol 50 mg oral tablet - 1 tab(s) orally every 6 hours for severe pain  Vitamin D3 125 mcg (5000 intl units) oral tablet - 1 tab(s) orally once a day  ferrous sulfate 325 mg oral tablet - 1 tab(s) orally 2 times a day     PRN Medication   Tums 500 mg oral tablet, chewable - 1 tab(s) orally once a day, As Needed     DNR Status:   ·  Code Status Code Status order at time of discharge: Full Code       Electronic Signatures:  Rupal Renee)  (Signed 06-Apr-2023 16:10)   Authored: Send Summary, Summary Content, Ongoing Care,  DNR  "Status, Note Completion      Last Updated: 06-Apr-2023 16:10 by Rupal Renee (DO)    References:  1.  Data Referenced From \"Consult-Medicine\" 05-Apr-2023 18:42   "

## 2023-09-14 NOTE — PROGRESS NOTES
Subjective Data:   CAROL ROLLINS is a 72 year old Female who is Hospital Day # 2 and POD #1 for 1. LEFT TOTAL KNEE ARTHROPLASTY;    Additional Information:    Patient complains of some pain at surgical site otherwise no active complaint.  No fever or chills, no nausea or vomiting.  Able to work with physical therapy and  hoping to go home    Objective Data:     Objective Information:      T   P  R  BP   MAP  SpO2   Value  36.6  55  18  114/58      98%  Date/Time 4/6 5:50 4/6 5:50 4/6 5:50 4/6 5:50    4/6 5:50  Range  (36.5C - 37.6C )  (53 - 82 )  (16 - 18 )  (101 - 150 )/ (55 - 85 )    (96% - 98% )  Highest temp of 37.6 C was recorded at 4/5 21:18      Pain reported at 4/6 9:40: 5 = Moderate      T   P  R  BP   MAP  SpO2   Value  36.6  55  18  114/58      98%  Date/Time 4/6 5:50 4/6 5:50 4/6 5:50 4/6 5:50    4/6 5:50  Range  (36.5C - 37.6C )  (53 - 82 )  (16 - 18 )  (101 - 150 )/ (55 - 85 )    (96% - 98% )  Highest temp of 37.6 C was recorded at 4/5 21:18    Physical Exam by System:    Constitutional: awake, alert, does not seem to be  in distress.   Eyes: PERRL, EOMI, clear sclera   ENMT: mucous membranes moist, no apparent injury,  no lesions seen   Head/Neck: Neck supple, thyroid without mass or tenderness,  No JVD,  no bruits   Respiratory/Thorax: No use of accessary muscles,  Bilateral equal air entry, no crackles or wheezing   Cardiovascular: S1S2  regular, no murmur   Gastrointestinal: Nondistended, soft, non-tender,  no organomegaly, +BS,   Musculoskeletal: Postop dressing on left lower extremity   Neurological: alert and oriented x3, intact senses,  motor, response and reflexes, normal strength   Psychological: Appropriate mood and behavior   Skin: Warm and dry, no lesions, no rashes     Assessment and Plan:   Code Status:  ·  Code Status Full Code     Advance Care Planning:  Advance Care Planning: I evaluated the patient  and determined the patient's capacity to understand the risks, benefits and  alternatives to treatment. I elicited the patient's goals for treatment and reviewed advance directives and medical orders for life sustaining treatment. The patient was given  an opportunity to review a blank advance directive as appropriate.     Assessment:      71 y/o F with PMHx s/f DLD, hypothyroidism, GERD, SHE, who was admitted s/p L TKA for primary localized OA. IMS consulted for medical management.    Thank you for involving us in the care of this very pleasant lady. We will continue to follow while she remains admitted.     Plan of Care Reviewed With:  Plan of Care Reviewed With: patient       Impression 1: OA of L knee s/p TKA 04/05/23   Plan for Impression 1: Adequate pain management  PT/OT  Further management as per primary service  Patient is medically stable   Impression 2: Hypothyroidism   Plan for Impression 2: -Continued home medications   Impression 3: DLD   Plan for Impression 3: On lifestyle modification   Impression 4: GERD   Plan for Impression 4: -Continued PPI   Impression 5: SHE   Plan for Impression 5: -Continued iron supplements   Impression 6: DVT Prophylaxis   Plan for Impression 6: -SCDs  -PT/OT  -Chemoprophylaxis per primary service       Electronic Signatures:  Bradley Yo)  (Signed 06-Apr-2023 12:17)   Authored: Subjective Data, Objective Data, Assessment  and Plan, Note Completion      Last Updated: 06-Apr-2023 12:17 by Bradley Yo)

## 2023-09-30 NOTE — DISCHARGE SUMMARY
Send Summary:   Discharge Summary Providers:  Provider Role Provider Name   · Primary Jl Lara       Note Recipients: Jl Lara MD       Discharge:    Summary:   Admission Date: .31-May-2023 05:51:00   Discharge Date: 01-Jun-2023   Attending Physician at Discharge: Rupal Renee   Admission Reason: right hip avascular necrosis   Final Discharge Diagnoses: Status post right hip  replacement   Procedures: Date: 31-May-2023 10:49:00  Procedure Name: 1. RIGHT TOTAL HIP ARTHROPLASTY **23 HR OBS**   Condition at Discharge: Satisfactory   Disposition at Discharge: .Home   Hospital Course:    ORTHOPEDIC SURGERY DISCHARGE SUMMARY  Attending Physician: Dr Rupal Renee   Reason for Hospitalization: Elective total hip arthroplasty     Admitting Diagnosis: Right hip AVN  Discharge Diagnosis: Same as admitting     Operations During Hospitalization: Right Total hip arthroplasty     Consultations: Physical Therapy, Case Management      Hospital Course:  This patient presented to admitting provider as an outpatient for hip osteoarthritis. It was determined that they would benefit from surgery in the form of a hip replacement. The procedure, its risks, benefits, and potential complications were discussed  in detail with the patient prior to surgery. Understanding of all topics was conveyed by the patient, and consent was given for surgery. The patient was electively admitted for surgery. Surgery was scheduled and they underwent a hip replacement.  The  procedure was tolerated well and they were sent to the post operative recovery room in stable condition, where they also did well. They were subsequently sent to their hospital room for postoperative management. Once on the floor their postoperative course  was unremarkable and they did well.  Thier diet was advanced and well tolerated. Their pain was well controlled on oral pain meds; this was eventually transitioned to oral medication alone prior to discharge.   They worked with Physical and Occupational  Therapy starting on POD# 0 who recommended they be discharged home. They remained afebrile with stable vital signs throughout her stay. Complete and comprehensive discharge instructions were provided to the patient as well as necessary prescriptions.  The patient had no further questions and was advised to call with any questions, concerns, or problems.     Patient was hemodynamically stable postoperatively. Discharge Antibiotics: Prior to surgery the patient was treated with antibiotics and continued with antibiotics 24 hours postoperatively    Transfers: PACU and then to the hospital surgical floor when PACU criteria was met.  Complications: Continued throughout the hospital course without complications.     PHYSICAL EXAM   GENERAL: A/Ox3, NAD. Appears healthy, well nourished  PSYCHIATRIC: Mood stable, appropriate memory recall  EYES: EOM intact, no scleral icterus  CARDIAC: regular rate  LUNGS: Breathing non-labored  SKIN: no erythema, rashes, or ecchymoses     MUSCULOSKELETAL:  Laterality:   Operative Hip Exam  - ROM, Extension: Full  - Dressing: clean and dry  - Negative homans  - Compartments soft  - Grossly NVI Distally on operative leg    NEUROVASCULAR:  - Neurovascular Status: sensation intact to light touch distally  - Capillary refill brisk at extremities, Bilateral dorsalis pedis pulse 2+        Discharge Information:    and Continuing Care:   Lab Results - Pending:    None  Radiology Results - Pending: None   Discharge Instructions:    Additional Orders:           Additional Instructions:   Rupal Renee DO  Phone: [phone] - [facility]     Postoperative Instructions: TOTAL HIP ARTHROPLASTY    WEIGHT BEARING: YOU ARE ALLOWED 50% WEIGHT BEARING ON YOUR OPERATIVE EXTREMITY    The following is a general guide and may be applied to most patients that go home from the hospital after surgery. If you go to a rehab facility the timeline may  deviate a little. Please do not hesitate to call our office for any questions or additional  guidance. We will work with you to get the best experience from your new joint replacement.     WEEK 1  Relax?Don?t Overdo it!  - Get up once an hour for light activity while you are awake. (get a drink, go to the bathroom, etc.)  - Keep the surgical site iced and elevated above your heart, if possible, while you are resting.  - You will have some light exercises given to you from the physical therapist in the hospital. They will teach you posterior hip precautions that you should be mindful of for the first six weeks after surgery.    SWELLING AND BRUISING    BRUISING AND SWELLING AFTER SURGERY IS NORMAL. As the patient becomes more mobile the bruising and swelling will begin to migrate towards the ankle and foot and is usually noticed toward the end of the day.    WEEK 2-3  You will have a prescription for physical therapy given to you or electronically prescribed and you should start outpatient therapy one week after your operation. Be sure to do the home exercises on the days you are not attending physical therapy.    - Continue to progress walking as tolerated.   - Continue to use ice for soreness on the surgical site  - You may wean from your walker to a cane when you feel safe and comfortable to do so. Your physical therapist will also be helpful with progressing your assistive device.   - Be careful with bending and twisting - If it hurts, stop!!    FOLLOW UP  - Your first post-operative visit with Dr. Rupal Renee should be scheduled for 2 WEEKS after surgery.  - You do not need new xrays for this visit  - Don?t be nervous! This visit is to see how you are doing and make sure your incision is healing nicely.     POSTOPERATIVE MEDICATIONS    PAIN MEDICATION    _______ Oxycodone  ? Oxycodone has been prescribed for post-operative pain control. Take one 5 mg tablet every 6 hours for pain. Alternate with Tramadol. See  medication example sheet. This medication will only be refilled ONCE every 7 days for a period of 6 weeks.  After 6 weeks, you will transition to acetaminophen (Tylenol) and over -the- counter anti-inflammatories such as Ibuprofen, Advil or Aleve in conjunction with ICE.   ? Side effects may be constipation and nausea, vomiting, sleepiness, dizziness, lightheadedness, headache, blurred vision, dry mouth sweating, itching (if you have itching, over-the -counter Benadryl can be used as needed).  ? You may NOT operate a motor vehicle while taking this medication or have been cleared by your surgeon or PA.     ________ Tramadol  ? Tramadol has been prescribed for post-operative pain control. Take one 50 mg tablet every 6 hours for pain. Alternate with Oxycodone. See medication example sheet. This medication will only be refilled ONCE every 7 days for a period of 6  weeks. After 6 weeks, you will transition to acetaminophen (Tylenol) and over- the- counter anti-inflammatories such as Ibuprofen, Advil or Aleve in conjunction with ICE.   ? Side effects may be constipation and nausea, vomiting, sleepiness, dizziness, lightheadedness, headache, blurred vision, dry mouth, sweating, itching (if you have itching, over-the -counter Benadryl can be used as needed).  ? You may NOT operate a motor vehicle while taking this medication or have been cleared by your surgeon or PA.     _________ Celecoxib (Celebrex) or Meloxicam (Mobic)  ? One of these will be prescribed (if you are able to take it) as an adjunct anti-inflammatory to assist in pain control. Take one twice daily for 4 weeks. See medication example sheet. You will not receive refills on this medication.  ? Side effects may include nausea or upset stomach    _________ Acetaminophen (Tylenol)  ? Acetaminophen has been prescribed as an adjunct for pain control. Take two 500 mg tablet every 6 - 8 hours for 4 weeks. See medication example sheet. No refills will be given after  initial prescription.  ? Side effects may include nausea, heartburn, drowsiness, and headache.    _________Cyclobenzaprine (Flexeril)  ? This is a muscle relaxer that will be prescribed   ? Take this AS NEEDED per instructions on bottle  ? This will be only prescribed once and is available to help with muscle spasms. There will be no refills of this medication    BLOOD THINNER    __________ Aspirin or Other Blood Thinner  ? Aspirin or another medication has been prescribed as a blood thinner to prevent blood clots in your leg or lungs. Take as prescribed on the bottle for 4 weeks. You will not receive a refill on this medication.  ? Do not take this medication if you are on another blood thinner.    ANTI NAUSEA    __________ Pantoprazole  ? Pantoprazole has been prescribed to help with nausea and protect your stomach while taking pain medication. Take one 40 mg tablet once daily for 4 weeks. You will not receive a refill on this medication.    ANTIBIOTIC    ? If you are deemed ?high risk? for infection after surgery and antibiotic will be prescribed. Please take this as directed for one week.     STOOL SOFTENERS    __________ Senna  ? Post-operative constipation can result due to a combination of inactivity, anesthesia and pain medication. To help prevent this, you should increase your water and fiber intake. Physical activity such as walking will also help stimulate the  bowels.   ? Senna has been prescribed to help with constipation while on Oxycodone and Tramadol. Take one tablet twice daily for 4 weeks. No refills will be given.  ? You may also take Miralax in combination with Senna to prevent constipation.  This can be purchased over the counter at your local pharmacy.  Take as instructed on the bottle.     Medication Refills - [phone]    If you need to request a medication refill, calls can be taken between Monday through Friday, 8am-1pm.  Any calls received outside of this timeframe will be handled on  the next business day.  Medication requests received on Saturday or Sunday will be  handled on Monday.    Per State and Institutional policy, pain medications can only be refilled every 7 days for six weeks following surgery.    Prescription refills will be placed upon request, if there are any issues we will contact you accordingly.  We are unable to place follow up calls to confirm receipt of refill requests.  Please follow up with your pharmacy to verify that your refill has  been sent and is ready for .    ICE  ? You have been prescribed to ice your total joint at a minimum of twice per hour for 20 minutes while awake during the first 6 weeks after surgery if you are using ice packs. This will help with pain control.  ? If you are using an ice machine, please follow ice machine instructions.    WOUND CARE    ? You will have an ace wrap on your leg put on during surgery. This compression wrap is for comfort and may be removed at any time. You may continue to use compression throughout your recovery if this is comfortable for you.  ? You have a waterproof bandage on your wound and may shower with this on. The waterproof bandage is to remain in place for 7 days. You may remove it yourself. You may leave your incision open to air after the bandage has been removed.    ? Under your waterproof bandage you have a mesh and glue dressin in place. Do not peel this off. This will be on for 3-4 weeks. You may trim the edges as they peel off on their own. You can continue to shower with this on. Let the water run  freely over your incision when showering and do not scrub.     ? DO NOT soak your incision in a bath, hot tub, pool or pond/lake for a minimum of 3 weeks following your surgery.    ? DO NOT use lotions, creams, ointments on your wound for a minimum of 3 weeks following your surgery. At that time you may use vitamin E to assist with softening of your incision.    DENTAL VISITS  It is recommended that you  avoid any elective dental work (cleaning, whitening, etc) for 3 months after your surgery. In case of a dental emergency (cavity, root canal) within the 3 month postoperative period you should be pre-medicated with antibiotics.  Please call us before any dental work so we can provide you with antibiotic coverage.     After 3 months, most healthy individuals do not require antibiotics for dental visits.    ?High Risk? patients (chemotherapy, history of transplant, immunocompromised, rheumatoid arthritis) will need antibiotics prior to dental work.  If you think you may be in this category please call the office for assistance.    EMERGENCIES  When to contact our office immediately:  ? Fever >101.5 for at least 48 hours after surgery or chills.  ? Excessive bleeding from incision(s). A small amount of drainage is normal and expected.  ? Signs of infection of incision(s)-excessive drainage that is soaking through your dressing (especially if it is pus-like), redness that is spreading out from the edges of your incision, or increased warmth around the area.  ? Excruciating pain for which the pain medication is not helping.  ? Severe calf pain.  ? Go directly to the emergency room or call 911, if you are experiencing chest pain or difficulty breathing.    RESTARTING HOME MEDICATIONS  ? You may restart your home medications the following day after your surgery UNLESS you have been given alternate instructions.    DIET  ? Resume your normal diet after surgery. If you are on a specific type of diet for your condition, resume that instead.        Total Joint Replacement Cold Therapy Recommendations      Cold therapy devices can be used before and after surgery to assist in comfort and help to reduce pain and swelling.  These devices differ from ice or ice packs whereas the mechanism circulates water through tubing and a pad to provide longer periods  of cold therapy to the desired site.  While in the hospital, you can use  your cold devices around the clock for optimal comfort.  We recommend using cold therapy after working with therapy or completing exercises on your own.  Once you are discharged  home, there is no set schedule in which you must follow while using cold therapy.  Below are a few points to remember when using a cold therapy device:    ? Read the ?s instructions prior to first the use.  ? Follow instructions for filling the cooler (water first, then ice).  ? Always make sure there is a layer of protection between the cold pad and your skin  o Clothing, Towel, Ace Bandage, etc.  ? Allow the device to circulate cold water throughout the pad prior wrapping the pad (approximately 10 minutes).  ? Place the pad appropriately to accommodate your needs and use the wraps provided to secure the pad to your body.  ? During waking hours, remove the cold pad every 1-2 hours to perform a skin check  o Detach the pad from the cooler and ambulate at least once every hour  ? After removing the pad, allow at least 30 minutes before resuming cold therapy  ? You may wear the cold therapy device during periods of sleep including overnight  o   If you wake up during the night, you can check the skin at this time.  You do not need to   wake up specifically to perform skin checks.  ? Empty the cooler and pad when device is not in use.  ? Follow ?s instructions for cleaning your cold therapy device.    OFFICE LOCATIONS    Location 1: [facility]  [address], [address]  Office Number: [phone]    Location 2: [facility]  [address]  [facility], [address]  Office Number: [phone]    Location 3: [address] [address]  Office Number: [phone]    Rupal Renee DO  Joint Replacement and Adult Reconstructive Surgery  [facility]      Home Care Certification:           Home Care Agency:    Home Team [phone]  [phone]          Skilled Disciplines Ordered:   PT,  OT    Home Care Services:           Home Care Skilled Service:   Rehab (PT/OT/SP eval and treat)    Discharge Medications: Home Medication   Vitamin B12 100 mcg oral tablet - 1 tab(s) orally once a day  levothyroxine 50 mcg (0.05 mg) oral tablet - 1 tab(s) oral once a day  Eye Multivitamin oral tablet - 1 tab(s) oral once a day  senna 8.6 mg oral tablet - 1 tab(s) orally 2 times a day as needed to prevent constipation   Vitamin D3 125 mcg (5000 intl units) oral tablet - 1 tab(s) orally once a day  ferrous sulfate 325 mg oral tablet - 1 tab(s) orally 2 times a day  acetaminophen 500 mg oral tablet - 2 tab(s) orally every 6 to 8 hours  for pain   aspirin 81 mg oral tablet, chewable - 1 tab(s) chewed 2 times a day for four weeks to prevent the formation of blood clots  CeleBREX 100 mg oral capsule - 1 cap(s) orally 2 times a day for pain, inflammation, swelling  cyclobenzaprine 5 mg oral tablet - 1 tab(s) orally 3 times a day as needed for muscle spasms  oxyCODONE 5 mg oral capsule - 1 cap(s) orally every 6 hours as needed for severe pain  Protonix 40 mg oral delayed release tablet - 1 tab(s) orally once a day   traMADol 50 mg oral tablet - 1 tab(s) orally every 6 hours for severe pain  doxycycline hyclate 100 mg oral capsule - 1 cap(s) orally 2 times a day to prevent infection     PRN Medication   Tums 500 mg oral tablet, chewable - 1 tab(s) orally once a day, As Needed     DNR Status:   ·  Code Status Code Status order at time of discharge: Full Code       Electronic Signatures:  Rupal Renee ()  (Signed 01-Jun-2023 07:18)   Authored: Send Summary, Summary Content, Ongoing Care,  DNR Status, Note Completion      Last Updated: 01-Jun-2023 07:18 by Rupal Renee ()

## 2023-09-30 NOTE — H&P
History & Physical Reviewed:   I have reviewed the History and Physical dated:  25-May-2023   History and Physical reviewed and relevant findings noted. Patient examined to review pertinent physical  findings.: No significant changes   Home Medications Reviewed: no changes noted   Allergies Reviewed: no changes noted       ERAS (Enhanced Recovery After Surgery):  ·  ERAS Patient: yes   ·  CPM/PAT Utilization: yes   ·  Immunonutrition Recovery Drink Utilization: no   ·  Carbohydrate Supplement Drink Utilization: no     Consent:   COVID-19 Consent:  ·  COVID-19 Risk Consent Surgeon has reviewed key risks related to the risk of lucie COVID-19 and if they contract COVID-19 what the risks are.       Electronic Signatures:  Rupal Renee ()  (Signed 31-May-2023 07:35)   Authored: History & Physical Reviewed, ERAS, Consent,  Note Completion      Last Updated: 31-May-2023 07:35 by Rupal Renee ()

## 2023-10-02 NOTE — OP NOTE
PROCEDURE DETAILS    Preoperative Diagnosis:  Primary localized osteoarthritis of left knee, M17.12    Postoperative Diagnosis:  Primary localized osteoarthritis of left knee, M17.12    Surgeon: Rupal Renee  Resident/Fellow/Other Assistant: Anu Hernandez    Procedure:  1. LEFT TOTAL KNEE ARTHROPLASTY    Anesthesia: No anesthesiologist associated with this case  Estimated Blood Loss: 150cc  Findings: see op note  Additional Details: OPERATIVE NOTE   MRN: [id number]  Surgery Date: 4/5/2023    Anesthesia: preop adductor canal/ipac block, local, spinal    Implants: BlockTrail Triathlon Total Knee System.   Triathlon pressfit baseplate/CS Poly: Size 3 x9mm  Cruciate Retaining Femoral Component: Size 4  Asymmetric Patella: Size 29    OPERATIVE INDICATIONS:  The patient  is well-known to me and initially presented as an outpatient. They have been treated for advanced knee osteoarthritis with therapy, anti-inflammatories, and activity modification, all without improvement of symptoms. We therefore reviewed  the alternative option of elective total knee arthroplasty. The risks and benefits of this procedure were discussed in detail as an outpatient and are documented in our outpatient record. The patient expressed full understanding and wished to proceed.  Informed consent was obtained and was placed on the medical chart    The risks, benefits and potential complications of the arthroplasty surgery were discussed with the patient in detail.  Risks including but not limited to the risk of anesthesia, DVT, pulmonary embolism with the possibility of death, retained infection,  and neurovascular injury.  Specific details of the procedure, hospitalization, recovery, rehabilitation, and long-term precautions were also provided. Pre-operative teaching was provided. Implant/prosthesis selection was outlined, and the many options  available were explained; the final choice will be made at the time of the procedure to match  the anatomy and condition of the bone, ligaments, tendons, and muscles. Understanding of all topics was conveyed to me by the patient, and consent was given  to proceed with a total knee arthroplasty.     Procedure:  The patient was identified in the preoperative area .Their knee was then marked by myself and the patient agreed to proceed with the outlined procedure.. They were transferred to the operating room and positioned supine on the OR table. Appropriate surgical  huddle was performed with myself present. Anesthesia was then successfully induced. The operative lower extremity was then prepped and draped in the normal sterile fashion. A critical pause was taken and we identified the patient, the site of surgery,  as well as the administration of preoperative IV antibiotics. The limb was then exsanguinated and tourniquet inflated to 250mmHg.    With the knee in 90-degrees of flexion an anterior midline incision was made. A standard medial parapatellar arthrotomy was then made and the knee was extended. A provisional soft tissue medial release was performed to  the posterior medial corner. Medial joint line osteophytes were excised from the tibia. The patellar fat pad was excised and the patella was everted and controlled with two towel clips. The thickness was measured with a caliper and a freehand measured  resection was then performed of the articular surface. The patella was then appropriately sized. Peg holes were drilled and a trial was inserted. Restoration of pre-resection thickness was noted. A protective plate was then applied to the patella and  it was subluxed into the lateral gutter. The knee was flexed to 90-degrees and retractors were inserted. The ACL and lateral meniscus were released. Whitesides alignment was then drawn on the distal femur and the femur was entered with the opening reamer.  Intramedullary femoral alignment guide was then inserted and the distal femoral cutting block was pinned  in place for a 9 mm measured resection at 5-degrees of valgus. The bone cuts were made and retractors were repositioned to expose the tibia. A tibial  extramedullary alignment tower was then applied parallel to the long axis and perpendicular on the sagittal plane. The cutting block was then pinned in place for a 2 mm measured resection of the eburnated medial plateau. The bone block was then excised  and the knee was extended. All the retractors were removed and spacer blocks were inserted. The spacer block confirmed correction of alignment and adequate resection. Varus and valgus stresses were then applied with the spacer block in with stability  of collateral ligaments noted. No flexure contracture was identified at this point. The knee was then flexed back to 90-degrees and the distal femur was sized to a sized appropriately using the posterior referencing guide. External rotation peg holes  were drilled in 3-degrees of external rotation to the posterior condylar axis. The four-in-one cutting guide was then impacted onto the femur and all the appropriate bone cuts were then made. Lamina  was inserted into the joint and the remainder  of menisci as well as any posterior osteophytes were excised. Collateral ligaments were balanced. Satisfied with the gap balance the tibia was then exposed. The tibia was appropriately sized and the tibial baseplate was then placed onto the tibia and  pinned in place in the appropriate amount of external rotation. It was noted to fit well without any overhang. Femoral and polyethylene trials were inserted and the knee was extended. With the trials in the knee was then noted to come to full extension  and flexed to greater than 120 degrees. The patella tracked centrally throughout the range of motion. Satisfied with this reconstruction the tibia was then broached and milled in appropriate fashion. All the trials were removed and the bony surfaces were  lavaged with normal  saline. Final components were then press-fit in place. They were noted to be fully seated and stable. The tourniquet was deflated and all active bleeding was cauterized. The knee was then thoroughly lavaged and the final tibial insert  was placed. An anesthetic injection cocktail was infiltrated throughout the soft tissues. The arthrotomy was then repaired with #1 barbed suture. Subcutaneous tissues were closed in layers and finally with monocryl. Skin glue was applied.. Sterile dressing  was applied and the patient was then transferred to the PACU in stable condition. All counts were correct at the end of the case.     Specimen(s): none     Drains: none     Complications:  none     Plan:                         Weight Bearing Status:  WBAT Bilateral LE                        Lee: none placed                        Dressing: Maintain for one week                        DVT Prophylaxis: ASA 81mg BID x 4 weeks                          Antibiotics: Continue x24 hours marielena-operatively                         Discharge/Follow-up: Follow up in clinic in two weeks    Electronically signed by:  Rupal Renee DO  Joint Replacement and Adult Reconstructive Surgery  [facility]                                  Attestation:   Note Completion:  Attending Attestation I performed the procedure without a resident         Electronic Signatures:  Rupal Renee)  (Signed 05-Apr-2023 13:46)   Authored: Post-Operative Note, Chart Review, Note Completion      Last Updated: 05-Apr-2023 13:46 by Rupal Renee ()

## 2023-10-02 NOTE — OP NOTE
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 RIGHT TOTAL HIP ARTHROPLASTY **23 HR OBS**    Anesthesia: Baljit Crawford  Estimated Blood Loss: 100cc  Findings: see op note  Additional Details: OPERATIVE NOTE   MRN: [id number]  Surgery Date: 5/31/2023    Anesthesia: general, local    Implant(s):   [place]aina Trident Hemispherical Shell:  52 with neutral X3 polyethylene liner  [place] Accolade II femoral stem: 5 high offset  Biolox Ceramic Femoral head:  36 -2.5mmmm  Taina 6.5 mm cancellous screw      OPERATIVE INDICATIONS:  The patient  is well-known to me and initially presented as an outpatient. 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 We therefore reviewed the alternative option of elective total  hip arthroplasty. The risks and benefits of this procedure were discussed in detail as an outpatient and are documented in our outpatient record. The patient expressed full understanding and wished to proceed. Informed consent was obtained and was placed  on the medical chart    The risks, benefits and potential complications of the arthroplasty surgery were discussed with the patient in detail.  Risks including but not limited to the risk of anesthesia, DVT, pulmonary embolism with the possibility of death, retained infection,  neurovascular injury, and leg length discrepancy.  Specific details of the procedure, hospitalization, recovery, rehabilitation, and long-term precautions were also provided. Pre-operative teaching was provided. Implant/prosthesis selection was outlined,  and the many options available were explained; the final choice will be made at the time of the procedure to match the anatomy and condition of the bone,  ligaments, tendons, and muscles. Understanding of all topics was conveyed to me by the patient, and  consent was given to proceed with a total hip arthroplasty.       Procedure:  The patient was identified in the preoperative area .Their hip was then marked by myself and the patient agreed to proceed with the outlined procedure. They were transferred to the operating room and positioned supine on the OR table. Appropriate surgical  huddle was performed with myself present. Anesthesia was then successfully induced. The patient was then positioned in the lateral decubitus position on a pegboard. All bony prominences were well-padded. The operative lower extremity was then prepped  and draped in the normal sterile fashion. A critical pause was taken and we identified the patient, the site of surgery, as well as the administration of preoperative IV antibiotics. The limb was then positioned neutral on a padded Edwards stand and bony  landmarks were identified on the skin.    A posterior- superior hip incision was then performed with the #10 blade scalpel and a minimally  invasive direct superior approach was made. The subcutaneous tissues were developed down to the level of the fascia. Electrocautery was used to achieve hemostasis. The fibers of the gluteus muriel were split bluntly just short of the level of the fascia  jann.The exposed pericapsular fat was removed with electrocautery. The interval between the gluteus medius and the piriformis tendon was then developed. The conjoint tendon was isolated and released from its insertion on the trochanter, tagged and retracted  posteriorly, protecting the sciatic nerve through the remainder of the case. The gluteus minimus was elevated from the capsule and a capsulotomy was then performed. Intracapsular retractors were positioned around the femoral neck and the hip was dislocated  posteriorly. The dislocated femoral head was noted to be eburnated over the majority of its  surface with circumferential head and neck junction osteophytes. Using a saw, a  provisional femoral neck osteotomy was then performed at a level based on the preoperative template. The femoral head was excised and evaluated with noted significant cartilage collapse due to underling AVN. The limb was then positioned neutral on a Edwards  stand and I proceeded with acetabular exposure. A Abdulaziz-type retractor was placed over the anterior column and an inferior capsular retractor was positioned below the acetabular teardrop. The acetabulum was prepared with hemispherical reamers. Starting  with a size 45 mm reamer, the acetabular bed was medialized to the level of the medial wall. Reamers were then directed posteriorly and superiorly into the sclerotic subchondral bone. Once good, bleeding cancellous bone was encountered in all four quadrants  we stopped reaming. The corresponding size of the last reamer was used to select a  Trident II hemispherical shell. This was then opened and impacted in approximately 40 degrees of abduction and 20 degrees of anteversion. One cancellous bone screw was  placed in the posterior superior quadrant and the polyethylene liner was then impacted into the shell. The retractors were then removed and attention was drawn towards the femur. The femur was delivered into the wound and a box chisel was placed into  the piriformis fossa. A canal awl was placed down the canal without resistance. Using the Accolade Broach System, the femur was prepared to accept a broach with good fill and rotational stability. With this in place, there was good rotational and axial  stability. The broach was noted to seat at the level of the calcar resection. No fractures were identified. Multiple trial head and neck combinations were then made and the reduced hip was stable to 90 degrees of flexion, 70 degrees of internal rotation,  and 20 degrees of adduction. The hip could be fully extended, externally rotated,  and abducted without any anterior dislocation. The leg lengths were restored equally. Intraoperative radiographs were then obtained which demonstrated satisfactory positioning  of the components. No fractures were identified. Satisfied with the reconstruction, the hip was dislocated and the femoral trials were removed. The final femoral stem and head were then impacted without complication. The hip was reduced one final time  and all of the tissues were thoroughly irrigated. An anesthetic injection cocktail was infiltrated throughout the soft tissues. The capsule was repaired anatomically with #1 Vicryl suture. The conjoint tendon was approximated to the posterior edge of  the trochanter with #1 Vicryl suture and the gluteal fascia was repaired with interrupted suture. The rest of the incision was closed in layers with a final layer of monocryl and skin glue. Occlusive dressing was then applied.  The drapes were then removed. The patient was then transferred to the PACU in stable condition. All counts were correct at the end of the case.     Specimen(s): none     Drains: none     Complications:  none     Plan:                         Weight Bearing Status:  50% WB RLE, WBAT LLE                        Range of Motion: Posterior hip precautions x6 weeks                        Lee: none placed                        Dressing: Maintain for one week                        DVT Prophylaxis:   ASA 81mg BID x 4 weeks                        Antibiotics: Continue x24 hours marielena-operatively and d/c home with one week doxy                        Discharge/Follow-up: Follow up in clinic in two weeks    Electronically signed by:  Rupal Renee DO  Joint Replacement and Adult Reconstructive Surgery  [facility]                                    Attestation:   Note Completion:  Attending Attestation I performed the procedure without a resident         Electronic Signatures:  Víctor  Rupal SÁNCHEZ)  (Signed 31-May-2023 10:51)   Authored: Post-Operative Note, Chart Review, Note Completion      Last Updated: 31-May-2023 10:51 by Rupal Renee (DO)

## 2023-10-09 ENCOUNTER — TELEPHONE (OUTPATIENT)
Dept: PRIMARY CARE | Facility: CLINIC | Age: 73
End: 2023-10-09
Payer: MEDICARE

## 2023-10-09 DIAGNOSIS — I10 PRIMARY HYPERTENSION: ICD-10-CM

## 2023-10-09 RX ORDER — LISINOPRIL 10 MG/1
10 TABLET ORAL DAILY
Qty: 90 TABLET | Refills: 3 | Status: SHIPPED | OUTPATIENT
Start: 2023-10-09 | End: 2023-11-10 | Stop reason: SINTOL

## 2023-10-09 NOTE — TELEPHONE ENCOUNTER
Needs a refill on her Lisinopril 10 mg takes it 1 time a day please send to [facility]

## 2023-10-31 ENCOUNTER — TELEPHONE (OUTPATIENT)
Dept: PRIMARY CARE | Facility: CLINIC | Age: 73
End: 2023-10-31
Payer: MEDICARE

## 2023-10-31 NOTE — PROGRESS NOTES
Pt stated she has had a cough for 3- 4 weeks.   Cough comes and goes but is daily and comes on up to 5-6 times a day.   At times related to phlegm down the back of her throat, but others there is no drainage  Gets a tickle and then starts coughing  Denies any congestion  No sick contacts  Does have allergies and is relating to the season changing but at times the cough is so harsh it may last a few minutes.   Does wake her up at night   Sputum is clear and varies from thin to thick  Wakes her up at night  Negative for fever/has not been sick    Tried zyrtec at night for 2 weeks and then claritin in am for 2 weeks without any change    Was put on lisinopril about 2 1/2 months ago  She was called 2 weeks after starting and had no cough at that point    Calling for any help in trying to relieve this?   She will try mucinex also

## 2023-11-10 ENCOUNTER — OFFICE VISIT (OUTPATIENT)
Dept: PRIMARY CARE | Facility: CLINIC | Age: 73
End: 2023-11-10
Payer: MEDICARE

## 2023-11-10 VITALS
BODY MASS INDEX: 21.86 KG/M2 | RESPIRATION RATE: 18 BRPM | SYSTOLIC BLOOD PRESSURE: 142 MMHG | OXYGEN SATURATION: 97 % | DIASTOLIC BLOOD PRESSURE: 80 MMHG | HEART RATE: 86 BPM | WEIGHT: 118.8 LBS | HEIGHT: 62 IN

## 2023-11-10 DIAGNOSIS — I10 PRIMARY HYPERTENSION: Primary | ICD-10-CM

## 2023-11-10 PROCEDURE — 3077F SYST BP >= 140 MM HG: CPT | Performed by: NURSE PRACTITIONER

## 2023-11-10 PROCEDURE — 3079F DIAST BP 80-89 MM HG: CPT | Performed by: NURSE PRACTITIONER

## 2023-11-10 PROCEDURE — 1126F AMNT PAIN NOTED NONE PRSNT: CPT | Performed by: NURSE PRACTITIONER

## 2023-11-10 PROCEDURE — 99213 OFFICE O/P EST LOW 20 MIN: CPT | Performed by: NURSE PRACTITIONER

## 2023-11-10 PROCEDURE — 1160F RVW MEDS BY RX/DR IN RCRD: CPT | Performed by: NURSE PRACTITIONER

## 2023-11-10 PROCEDURE — 1159F MED LIST DOCD IN RCRD: CPT | Performed by: NURSE PRACTITIONER

## 2023-11-10 PROCEDURE — 1036F TOBACCO NON-USER: CPT | Performed by: NURSE PRACTITIONER

## 2023-11-10 RX ORDER — IBUPROFEN 200 MG
400 TABLET ORAL EVERY 6 HOURS PRN
COMMUNITY
End: 2024-03-14 | Stop reason: WASHOUT

## 2023-11-10 RX ORDER — AMLODIPINE BESYLATE 5 MG/1
5 TABLET ORAL DAILY
Qty: 30 TABLET | Refills: 0 | Status: SHIPPED | OUTPATIENT
Start: 2023-11-10 | End: 2024-03-14 | Stop reason: SDUPTHER

## 2023-11-10 ASSESSMENT — ENCOUNTER SYMPTOMS
DEPRESSION: 0
COUGH: 1
LOSS OF SENSATION IN FEET: 0
OCCASIONAL FEELINGS OF UNSTEADINESS: 0

## 2023-11-10 ASSESSMENT — PATIENT HEALTH QUESTIONNAIRE - PHQ9
2. FEELING DOWN, DEPRESSED OR HOPELESS: SEVERAL DAYS
1. LITTLE INTEREST OR PLEASURE IN DOING THINGS: NOT AT ALL
10. IF YOU CHECKED OFF ANY PROBLEMS, HOW DIFFICULT HAVE THESE PROBLEMS MADE IT FOR YOU TO DO YOUR WORK, TAKE CARE OF THINGS AT HOME, OR GET ALONG WITH OTHER PEOPLE: NOT DIFFICULT AT ALL
SUM OF ALL RESPONSES TO PHQ9 QUESTIONS 1 AND 2: 1

## 2023-11-10 ASSESSMENT — ANXIETY QUESTIONNAIRES
7. FEELING AFRAID AS IF SOMETHING AWFUL MIGHT HAPPEN: NOT AT ALL
GAD7 TOTAL SCORE: 1
1. FEELING NERVOUS, ANXIOUS, OR ON EDGE: SEVERAL DAYS
5. BEING SO RESTLESS THAT IT IS HARD TO SIT STILL: NOT AT ALL
4. TROUBLE RELAXING: NOT AT ALL
3. WORRYING TOO MUCH ABOUT DIFFERENT THINGS: NOT AT ALL
2. NOT BEING ABLE TO STOP OR CONTROL WORRYING: NOT AT ALL
6. BECOMING EASILY ANNOYED OR IRRITABLE: NOT AT ALL

## 2023-11-10 ASSESSMENT — COLUMBIA-SUICIDE SEVERITY RATING SCALE - C-SSRS
6. HAVE YOU EVER DONE ANYTHING, STARTED TO DO ANYTHING, OR PREPARED TO DO ANYTHING TO END YOUR LIFE?: NO
2. HAVE YOU ACTUALLY HAD ANY THOUGHTS OF KILLING YOURSELF?: NO
1. IN THE PAST MONTH, HAVE YOU WISHED YOU WERE DEAD OR WISHED YOU COULD GO TO SLEEP AND NOT WAKE UP?: NO

## 2023-11-10 ASSESSMENT — PAIN SCALES - GENERAL: PAINLEVEL: 0-NO PAIN

## 2023-11-10 NOTE — PROGRESS NOTES
"Subjective   Patient ID: Oralia Fam is a 73 y.o. female who presents for Cough (Sinus congestion nonproductive).    This is a patient of Dr. Blas presenting with a dry hacking cough for over a month. The cough is waxing and waning with no associated symptoms. Advises she was started on Lisinopril 10mg daily a bout a couple of months ago.  I suspect the cough is side effect of ACE inhibitor.  I do not suspect GERD, allergy or viral upper respiratory infection.  She does not have history of asthma.    Cough         Review of Systems   Respiratory:  Positive for cough.    All other systems reviewed and are negative.      Objective   /80 (BP Location: Left arm, Patient Position: Sitting, BP Cuff Size: Adult)   Pulse 86   Resp 18   Ht 1.575 m (5' 2\")   Wt 53.9 kg (118 lb 12.8 oz)   SpO2 97%   BMI 21.73 kg/m²     Physical Exam  Constitutional:       Appearance: Normal appearance.   HENT:      Head: Normocephalic and atraumatic.      Right Ear: External ear normal.      Left Ear: External ear normal.      Nose: Nose normal.      Mouth/Throat:      Mouth: Mucous membranes are moist.   Cardiovascular:      Rate and Rhythm: Normal rate and regular rhythm.      Pulses: Normal pulses.      Heart sounds: Normal heart sounds.   Pulmonary:      Effort: Pulmonary effort is normal.      Breath sounds: Normal breath sounds.   Musculoskeletal:      Cervical back: Neck supple.   Skin:     General: Skin is warm and dry.   Neurological:      General: No focal deficit present.      Mental Status: She is alert and oriented to person, place, and time.   Psychiatric:         Mood and Affect: Mood normal.         Behavior: Behavior normal.         Thought Content: Thought content normal.         Judgment: Judgment normal.         Assessment/Plan   Problem List Items Addressed This Visit    None         "

## 2023-11-10 NOTE — PATIENT INSTRUCTIONS
I have started you on Amlodipine 5mg daily for blood pressure control. Stop taking Lisinopril and follow up in a month.

## 2023-12-04 ENCOUNTER — TELEPHONE (OUTPATIENT)
Dept: PRIMARY CARE | Facility: CLINIC | Age: 73
End: 2023-12-04
Payer: MEDICARE

## 2023-12-04 ENCOUNTER — LAB (OUTPATIENT)
Dept: LAB | Facility: LAB | Age: 73
End: 2023-12-04
Payer: MEDICARE

## 2023-12-04 DIAGNOSIS — R30.0 BURNING WITH URINATION: ICD-10-CM

## 2023-12-04 DIAGNOSIS — N30.00 ACUTE CYSTITIS WITHOUT HEMATURIA: Primary | ICD-10-CM

## 2023-12-04 LAB
APPEARANCE UR: ABNORMAL
BACTERIA #/AREA URNS AUTO: ABNORMAL /HPF
BILIRUB UR STRIP.AUTO-MCNC: NEGATIVE MG/DL
COLOR UR: YELLOW
GLUCOSE UR STRIP.AUTO-MCNC: NEGATIVE MG/DL
KETONES UR STRIP.AUTO-MCNC: ABNORMAL MG/DL
LEUKOCYTE ESTERASE UR QL STRIP.AUTO: ABNORMAL
MUCOUS THREADS #/AREA URNS AUTO: ABNORMAL /LPF
NITRITE UR QL STRIP.AUTO: POSITIVE
PH UR STRIP.AUTO: 5 [PH]
PROT UR STRIP.AUTO-MCNC: ABNORMAL MG/DL
RBC # UR STRIP.AUTO: ABNORMAL /UL
RBC #/AREA URNS AUTO: ABNORMAL /HPF
SP GR UR STRIP.AUTO: 1.01
UROBILINOGEN UR STRIP.AUTO-MCNC: <2 MG/DL
WBC #/AREA URNS AUTO: >50 /HPF
WBC CLUMPS #/AREA URNS AUTO: ABNORMAL /HPF

## 2023-12-04 PROCEDURE — 87186 SC STD MICRODIL/AGAR DIL: CPT

## 2023-12-04 PROCEDURE — 87086 URINE CULTURE/COLONY COUNT: CPT

## 2023-12-04 PROCEDURE — 81001 URINALYSIS AUTO W/SCOPE: CPT

## 2023-12-07 LAB — BACTERIA UR CULT: ABNORMAL

## 2023-12-07 RX ORDER — NITROFURANTOIN 25; 75 MG/1; MG/1
100 CAPSULE ORAL 2 TIMES DAILY
Qty: 10 CAPSULE | Refills: 0 | Status: SHIPPED | OUTPATIENT
Start: 2023-12-07 | End: 2023-12-12

## 2023-12-08 ENCOUNTER — TELEPHONE (OUTPATIENT)
Dept: PRIMARY CARE | Facility: CLINIC | Age: 73
End: 2023-12-08
Payer: MEDICARE

## 2023-12-08 NOTE — TELEPHONE ENCOUNTER
----- Message from Buddy Blas DO sent at 12/7/2023  1:24 PM EST -----  Culture positive for E. coli urinary tract infection sensitive to nitrofurantoin 100 mg twice a day for 5 days will call in prescription to local pharmacy

## 2023-12-21 ENCOUNTER — APPOINTMENT (OUTPATIENT)
Dept: PRIMARY CARE | Facility: CLINIC | Age: 73
End: 2023-12-21
Payer: MEDICARE

## 2024-02-14 ENCOUNTER — OFFICE VISIT (OUTPATIENT)
Dept: PRIMARY CARE | Facility: CLINIC | Age: 74
End: 2024-02-14
Payer: MEDICARE

## 2024-02-14 VITALS
DIASTOLIC BLOOD PRESSURE: 92 MMHG | SYSTOLIC BLOOD PRESSURE: 174 MMHG | HEART RATE: 58 BPM | BODY MASS INDEX: 22.51 KG/M2 | OXYGEN SATURATION: 98 % | HEIGHT: 61 IN | WEIGHT: 119.2 LBS

## 2024-02-14 DIAGNOSIS — Z00.00 MEDICARE ANNUAL WELLNESS VISIT, SUBSEQUENT: Primary | ICD-10-CM

## 2024-02-14 DIAGNOSIS — T46.4X5A ACE-INHIBITOR COUGH: ICD-10-CM

## 2024-02-14 DIAGNOSIS — I10 PRIMARY HYPERTENSION: ICD-10-CM

## 2024-02-14 DIAGNOSIS — R91.8 ABNORMAL CHEST X-RAY WITH MULTIPLE LUNG NODULES: ICD-10-CM

## 2024-02-14 DIAGNOSIS — D64.9 ANEMIA, UNSPECIFIED TYPE: ICD-10-CM

## 2024-02-14 DIAGNOSIS — H35.3131 EARLY DRY STAGE NONEXUDATIVE AGE-RELATED MACULAR DEGENERATION OF BOTH EYES: ICD-10-CM

## 2024-02-14 DIAGNOSIS — N39.46 URGE AND STRESS INCONTINENCE: ICD-10-CM

## 2024-02-14 DIAGNOSIS — I87.2 CHRONIC VENOUS INSUFFICIENCY OF LOWER EXTREMITY: ICD-10-CM

## 2024-02-14 DIAGNOSIS — M81.0 POSTMENOPAUSAL BONE LOSS: ICD-10-CM

## 2024-02-14 DIAGNOSIS — K21.9 GASTROESOPHAGEAL REFLUX DISEASE WITHOUT ESOPHAGITIS: ICD-10-CM

## 2024-02-14 DIAGNOSIS — Z12.31 SCREENING MAMMOGRAM FOR BREAST CANCER: ICD-10-CM

## 2024-02-14 DIAGNOSIS — R05.8 ACE-INHIBITOR COUGH: ICD-10-CM

## 2024-02-14 DIAGNOSIS — E03.9 ACQUIRED HYPOTHYROIDISM: ICD-10-CM

## 2024-02-14 PROCEDURE — G0439 PPPS, SUBSEQ VISIT: HCPCS | Performed by: INTERNAL MEDICINE

## 2024-02-14 PROCEDURE — 99397 PER PM REEVAL EST PAT 65+ YR: CPT | Performed by: INTERNAL MEDICINE

## 2024-02-14 PROCEDURE — 1160F RVW MEDS BY RX/DR IN RCRD: CPT | Performed by: INTERNAL MEDICINE

## 2024-02-14 PROCEDURE — 1036F TOBACCO NON-USER: CPT | Performed by: INTERNAL MEDICINE

## 2024-02-14 PROCEDURE — G0446 INTENS BEHAVE THER CARDIO DX: HCPCS | Performed by: INTERNAL MEDICINE

## 2024-02-14 PROCEDURE — G0444 DEPRESSION SCREEN ANNUAL: HCPCS | Performed by: INTERNAL MEDICINE

## 2024-02-14 PROCEDURE — 99497 ADVNCD CARE PLAN 30 MIN: CPT | Performed by: INTERNAL MEDICINE

## 2024-02-14 PROCEDURE — 1170F FXNL STATUS ASSESSED: CPT | Performed by: INTERNAL MEDICINE

## 2024-02-14 PROCEDURE — 3080F DIAST BP >= 90 MM HG: CPT | Performed by: INTERNAL MEDICINE

## 2024-02-14 PROCEDURE — G0442 ANNUAL ALCOHOL SCREEN 15 MIN: HCPCS | Performed by: INTERNAL MEDICINE

## 2024-02-14 PROCEDURE — 3077F SYST BP >= 140 MM HG: CPT | Performed by: INTERNAL MEDICINE

## 2024-02-14 PROCEDURE — 1126F AMNT PAIN NOTED NONE PRSNT: CPT | Performed by: INTERNAL MEDICINE

## 2024-02-14 PROCEDURE — 99215 OFFICE O/P EST HI 40 MIN: CPT | Performed by: INTERNAL MEDICINE

## 2024-02-14 PROCEDURE — 1159F MED LIST DOCD IN RCRD: CPT | Performed by: INTERNAL MEDICINE

## 2024-02-14 ASSESSMENT — ACTIVITIES OF DAILY LIVING (ADL)
BATHING: INDEPENDENT
DOING_HOUSEWORK: INDEPENDENT
MANAGING_FINANCES: INDEPENDENT
GROCERY_SHOPPING: INDEPENDENT
TAKING_MEDICATION: INDEPENDENT
DRESSING: INDEPENDENT

## 2024-02-14 ASSESSMENT — ENCOUNTER SYMPTOMS
DEPRESSION: 0
OCCASIONAL FEELINGS OF UNSTEADINESS: 0
LOSS OF SENSATION IN FEET: 0

## 2024-02-14 ASSESSMENT — PATIENT HEALTH QUESTIONNAIRE - PHQ9
2. FEELING DOWN, DEPRESSED OR HOPELESS: NOT AT ALL
1. LITTLE INTEREST OR PLEASURE IN DOING THINGS: NOT AT ALL
SUM OF ALL RESPONSES TO PHQ9 QUESTIONS 1 AND 2: 0

## 2024-02-14 NOTE — ASSESSMENT & PLAN NOTE
Will order to be evaluated through the empower study questionnaire to be part of incontinence study improve overall quality of life was recently treated with urinary tract infection we will check urinalysis culture

## 2024-02-14 NOTE — PROGRESS NOTES
"Subjective   Reason for Visit: Oralia Fam is an 73 y.o. female here for a Medicare Wellness visit.     Past Medical, Surgical, and Family History reviewed and updated in chart.    Reviewed all medications by prescribing practitioner or clinical pharmacist (such as prescriptions, OTCs, herbal therapies and supplements) and documented in the medical record.    HPI    Patient Care Team:  Buddy Blas DO as PCP - General (Internal Medicine)  Jl Lara MD as PCP - [employer] PCP     Review of Systems    Objective   Vitals:  BP (!) 174/92 (BP Location: Left arm, Patient Position: Sitting, BP Cuff Size: Adult)   Pulse 58   Ht 1.549 m (5' 1\")   Wt 54.1 kg (119 lb 3.2 oz)   SpO2 98%   BMI 22.52 kg/m²       Physical Exam    Assessment/Plan   Problem List Items Addressed This Visit       Dyslipidemia    Hypothyroid    Primary hypertension    BPPV (benign paroxysmal positional vertigo), left    Gastroesophageal reflux disease without esophagitis    History of right hip replacement    History of left knee replacement    Medicare annual wellness visit, subsequent - Primary     Other Visit Diagnoses       Screening mammogram for breast cancer        Postmenopausal bone loss                   "

## 2024-02-14 NOTE — ASSESSMENT & PLAN NOTE
Schedule screening mammogram and bone density for osteoporosis screening breast cancer screening hepatitis screening order encourage patient to obtain zoster vaccination at pharmacy

## 2024-02-14 NOTE — ASSESSMENT & PLAN NOTE
Patient with history of varicosities and lower extremity edema chronic and difficult to wear compression stockings ultrasound revealed no vascular reflux we will look at other options to reduce dependent edema

## 2024-02-14 NOTE — PROGRESS NOTES
"Subjective   Reason for Visit: Oralia Fam is an 73 y.o. female here for a Medicare Wellness visit.     Past Medical, Surgical, and Family History reviewed and updated in chart.    Reviewed all medications by prescribing practitioner or clinical pharmacist (such as prescriptions, OTCs, herbal therapies and supplements) and documented in the medical record.    HPI    Patient Care Team:  Buddy Blas DO as PCP - General (Internal Medicine)  Jl Lara MD as PCP - [employer] PCP     Review of Systems   All other systems reviewed and are negative.  Alcohol consumption becomes hazardous when consuming women oh over 65 years old greater than 7 drinks per week or greater than 3 drinks per occasion for men greater than 14 drinks per week or greater than 4 drinks per occasion.  I spent 15 minutes screening for alcohol use.Depression and anxiety screening completed   PHQ9 score   GAD7 score   I spent 15 minutes obtaining and discussing depression screening using PHQ 2 questions with results documented in chart.  Screening using PHQ-9 and ARIELLE-7 scores were used for follow-up with treatment and referral plan discussed.      I spent greater than 15 minutes discussing advance care planning including the explanation and discussion of advanced directives.  If patient does not have current up-to-date documents examples and information provided on how to create both living will and power of .  toolkit was given to patient and was encouraged to work out completing these documents.    Objective   Vitals:  BP (!) 174/92 (BP Location: Left arm, Patient Position: Sitting, BP Cuff Size: Adult)   Pulse 58   Ht 1.549 m (5' 1\")   Wt 54.1 kg (119 lb 3.2 oz)   SpO2 98%   BMI 22.52 kg/m²       Physical Exam  Vitals and nursing note reviewed.   Constitutional:       General: She is not in acute distress.     Appearance: Normal appearance. She is well-developed. She is not toxic-appearing.   HENT:    "   Head: Normocephalic and atraumatic.      Right Ear: Tympanic membrane and external ear normal.      Left Ear: Tympanic membrane and external ear normal.      Nose: Nose normal.      Mouth/Throat:      Mouth: Mucous membranes are moist.      Pharynx: Oropharynx is clear. No oropharyngeal exudate or posterior oropharyngeal erythema.      Tonsils: No tonsillar exudate. 2+ on the right. 2+ on the left.   Eyes:      Extraocular Movements: Extraocular movements intact.      Conjunctiva/sclera: Conjunctivae normal.   Cardiovascular:      Rate and Rhythm: Normal rate and regular rhythm.      Pulses: Normal pulses.      Heart sounds: Normal heart sounds. No murmur heard.  Pulmonary:      Effort: Pulmonary effort is normal.      Breath sounds: Normal breath sounds.   Abdominal:      General: Abdomen is flat. Bowel sounds are normal.      Palpations: Abdomen is soft.   Musculoskeletal:      Cervical back: Neck supple.   Lymphadenopathy:      Cervical: No cervical adenopathy.   Skin:     General: Skin is warm and dry.      Findings: No rash.   Neurological:      Mental Status: She is alert. Mental status is at baseline.   Psychiatric:         Mood and Affect: Mood normal.         Behavior: Behavior normal.         Thought Content: Thought content normal.         Judgment: Judgment normal.         Assessment/Plan   Problem List Items Addressed This Visit       Hypothyroid    Current Assessment & Plan     Reevaluate thyroid functions on levothyroxine         Relevant Orders    Tsh With Reflex To Free T4 If Abnormal    Hepatitis C antibody    Comprehensive metabolic panel    CBC and Auto Differential    Iron and TIBC    Vitamin B12    BI mammo bilateral screening tomosynthesis    XR DEXA bone density    Primary hypertension    Current Assessment & Plan     Blood pressure elevated start amlodipine 5 mg daily reevaluate in 1 month with blood work         Relevant Orders    Tsh With Reflex To Free T4 If Abnormal    Hepatitis C  antibody    Comprehensive metabolic panel    CBC and Auto Differential    Iron and TIBC    Vitamin B12    BI mammo bilateral screening tomosynthesis    XR DEXA bone density    Follow Up In [facility] - PCP - Established    Gastroesophageal reflux disease without esophagitis    Relevant Orders    Tsh With Reflex To Free T4 If Abnormal    Hepatitis C antibody    Comprehensive metabolic panel    CBC and Auto Differential    Iron and TIBC    Vitamin B12    BI mammo bilateral screening tomosynthesis    XR DEXA bone density    Chronic venous insufficiency of lower extremity    Current Assessment & Plan     Patient with history of varicosities and lower extremity edema chronic and difficult to wear compression stockings ultrasound revealed no vascular reflux we will look at other options to reduce dependent edema         Medicare annual wellness visit, subsequent - Primary    Current Assessment & Plan     Schedule screening mammogram and bone density for osteoporosis screening breast cancer screening hepatitis screening order encourage patient to obtain zoster vaccination at pharmacy         ACE-inhibitor cough    Early dry stage nonexudative age-related macular degeneration of both eyes    Current Assessment & Plan     Referral to ophthalmologist given family history and early dry stage macular degeneration         Relevant Orders    Referral to Ophthalmology    Urge and stress incontinence    Current Assessment & Plan     Will order to be evaluated through the empower study questionnaire to be part of incontinence study improve overall quality of life was recently treated with urinary tract infection we will check urinalysis culture         Relevant Orders    Urine Culture    Urinalysis with Reflex Microscopic    Postmenopausal bone loss    Current Assessment & Plan     Bone density ordered to evaluate for osteoporosis risk         Relevant Orders    XR DEXA bone density    Anemia    Current Assessment & Plan      Secondary to postoperative blood loss we will reevaluate         Relevant Orders    Iron and TIBC    Screening mammogram for breast cancer    Current Assessment & Plan     Schedule screening mammogram         Relevant Orders    BI mammo bilateral screening tomosynthesis    Abnormal chest x-ray with multiple lung nodules    Current Assessment & Plan     Lung nodules noted on preoperative chest x-ray in the past patient with chronic cough thought to be ACE inhibitor from previous physician evaluation patient non-smoker but exposed to secondhand cough from both parents and  patient with pulmonary nodules noted on chest x-ray to evaluate with CAT scan of the chest noncontrast reevaluate patient in 1 month         Relevant Orders    CT chest wo IV contrast

## 2024-02-14 NOTE — ASSESSMENT & PLAN NOTE
Lung nodules noted on preoperative chest x-ray in the past patient with chronic cough thought to be ACE inhibitor from previous physician evaluation patient non-smoker but exposed to secondhand cough from both parents and  patient with pulmonary nodules noted on chest x-ray to evaluate with CAT scan of the chest noncontrast reevaluate patient in 1 month

## 2024-02-16 ENCOUNTER — LAB (OUTPATIENT)
Dept: LAB | Facility: LAB | Age: 74
End: 2024-02-16
Payer: MEDICARE

## 2024-02-16 DIAGNOSIS — I10 PRIMARY HYPERTENSION: ICD-10-CM

## 2024-02-16 DIAGNOSIS — K21.9 GASTROESOPHAGEAL REFLUX DISEASE WITHOUT ESOPHAGITIS: ICD-10-CM

## 2024-02-16 DIAGNOSIS — N30.00 ACUTE CYSTITIS WITHOUT HEMATURIA: Primary | ICD-10-CM

## 2024-02-16 DIAGNOSIS — E03.9 ACQUIRED HYPOTHYROIDISM: ICD-10-CM

## 2024-02-16 DIAGNOSIS — D64.9 ANEMIA, UNSPECIFIED TYPE: ICD-10-CM

## 2024-02-16 DIAGNOSIS — N39.46 URGE AND STRESS INCONTINENCE: ICD-10-CM

## 2024-02-16 LAB
ALBUMIN SERPL BCP-MCNC: 4.3 G/DL (ref 3.4–5)
ALP SERPL-CCNC: 78 U/L (ref 33–136)
ALT SERPL W P-5'-P-CCNC: 9 U/L (ref 7–45)
ANION GAP SERPL CALC-SCNC: 14 MMOL/L (ref 10–20)
APPEARANCE UR: ABNORMAL
AST SERPL W P-5'-P-CCNC: 13 U/L (ref 9–39)
BACTERIA #/AREA URNS AUTO: ABNORMAL /HPF
BASOPHILS # BLD AUTO: 0.06 X10*3/UL (ref 0–0.1)
BASOPHILS NFR BLD AUTO: 1.1 %
BILIRUB SERPL-MCNC: 0.5 MG/DL (ref 0–1.2)
BILIRUB UR STRIP.AUTO-MCNC: NEGATIVE MG/DL
BUN SERPL-MCNC: 14 MG/DL (ref 6–23)
CALCIUM SERPL-MCNC: 9.2 MG/DL (ref 8.6–10.3)
CHLORIDE SERPL-SCNC: 96 MMOL/L (ref 98–107)
CO2 SERPL-SCNC: 25 MMOL/L (ref 21–32)
COLOR UR: YELLOW
CREAT SERPL-MCNC: 0.81 MG/DL (ref 0.5–1.05)
EGFRCR SERPLBLD CKD-EPI 2021: 77 ML/MIN/1.73M*2
EOSINOPHIL # BLD AUTO: 0.2 X10*3/UL (ref 0–0.4)
EOSINOPHIL NFR BLD AUTO: 3.5 %
ERYTHROCYTE [DISTWIDTH] IN BLOOD BY AUTOMATED COUNT: 14.1 % (ref 11.5–14.5)
GLUCOSE SERPL-MCNC: 98 MG/DL (ref 74–99)
GLUCOSE UR STRIP.AUTO-MCNC: NEGATIVE MG/DL
HCT VFR BLD AUTO: 39.5 % (ref 36–46)
HCV AB SER QL: NONREACTIVE
HGB BLD-MCNC: 11.9 G/DL (ref 12–16)
IMM GRANULOCYTES # BLD AUTO: 0.01 X10*3/UL (ref 0–0.5)
IMM GRANULOCYTES NFR BLD AUTO: 0.2 % (ref 0–0.9)
IRON SATN MFR SERPL: 24 % (ref 25–45)
IRON SERPL-MCNC: 90 UG/DL (ref 35–150)
KETONES UR STRIP.AUTO-MCNC: NEGATIVE MG/DL
LEUKOCYTE ESTERASE UR QL STRIP.AUTO: ABNORMAL
LYMPHOCYTES # BLD AUTO: 1.58 X10*3/UL (ref 0.8–3)
LYMPHOCYTES NFR BLD AUTO: 27.9 %
MCH RBC QN AUTO: 28.6 PG (ref 26–34)
MCHC RBC AUTO-ENTMCNC: 30.1 G/DL (ref 32–36)
MCV RBC AUTO: 95 FL (ref 80–100)
MONOCYTES # BLD AUTO: 0.51 X10*3/UL (ref 0.05–0.8)
MONOCYTES NFR BLD AUTO: 9 %
NEUTROPHILS # BLD AUTO: 3.31 X10*3/UL (ref 1.6–5.5)
NEUTROPHILS NFR BLD AUTO: 58.3 %
NITRITE UR QL STRIP.AUTO: NEGATIVE
NRBC BLD-RTO: 0 /100 WBCS (ref 0–0)
PH UR STRIP.AUTO: 5 [PH]
PLATELET # BLD AUTO: 332 X10*3/UL (ref 150–450)
POTASSIUM SERPL-SCNC: 4.1 MMOL/L (ref 3.5–5.3)
PROT SERPL-MCNC: 7.3 G/DL (ref 6.4–8.2)
PROT UR STRIP.AUTO-MCNC: NEGATIVE MG/DL
RBC # BLD AUTO: 4.16 X10*6/UL (ref 4–5.2)
RBC # UR STRIP.AUTO: NEGATIVE /UL
RBC #/AREA URNS AUTO: ABNORMAL /HPF
SODIUM SERPL-SCNC: 131 MMOL/L (ref 136–145)
SP GR UR STRIP.AUTO: 1.01
TIBC SERPL-MCNC: 370 UG/DL (ref 240–445)
TSH SERPL-ACNC: 1.13 MIU/L (ref 0.44–3.98)
UIBC SERPL-MCNC: 280 UG/DL (ref 110–370)
UROBILINOGEN UR STRIP.AUTO-MCNC: <2 MG/DL
VIT B12 SERPL-MCNC: 382 PG/ML (ref 211–911)
WBC # BLD AUTO: 5.7 X10*3/UL (ref 4.4–11.3)
WBC #/AREA URNS AUTO: ABNORMAL /HPF

## 2024-02-16 PROCEDURE — 83550 IRON BINDING TEST: CPT

## 2024-02-16 PROCEDURE — 87086 URINE CULTURE/COLONY COUNT: CPT

## 2024-02-16 PROCEDURE — 83540 ASSAY OF IRON: CPT

## 2024-02-16 PROCEDURE — 84443 ASSAY THYROID STIM HORMONE: CPT

## 2024-02-16 PROCEDURE — 81001 URINALYSIS AUTO W/SCOPE: CPT

## 2024-02-16 PROCEDURE — 87186 SC STD MICRODIL/AGAR DIL: CPT

## 2024-02-16 PROCEDURE — 86803 HEPATITIS C AB TEST: CPT

## 2024-02-16 PROCEDURE — 85025 COMPLETE CBC W/AUTO DIFF WBC: CPT

## 2024-02-16 PROCEDURE — 82607 VITAMIN B-12: CPT

## 2024-02-16 PROCEDURE — 36415 COLL VENOUS BLD VENIPUNCTURE: CPT

## 2024-02-16 PROCEDURE — 80053 COMPREHEN METABOLIC PANEL: CPT

## 2024-02-18 LAB — BACTERIA UR CULT: ABNORMAL

## 2024-02-19 RX ORDER — NITROFURANTOIN 25; 75 MG/1; MG/1
100 CAPSULE ORAL 2 TIMES DAILY
Qty: 10 CAPSULE | Refills: 0 | Status: SHIPPED | OUTPATIENT
Start: 2024-02-19 | End: 2024-02-24

## 2024-02-20 ENCOUNTER — TELEPHONE (OUTPATIENT)
Dept: PRIMARY CARE | Facility: CLINIC | Age: 74
End: 2024-02-20
Payer: MEDICARE

## 2024-02-20 DIAGNOSIS — N39.0 URINARY TRACT INFECTION WITHOUT HEMATURIA, SITE UNSPECIFIED: ICD-10-CM

## 2024-02-20 NOTE — TELEPHONE ENCOUNTER
----- Message from Buddy Blas DO sent at 2/19/2024  6:36 PM EST -----  Patient with E. coli UTI on urine culture and screening resistant to Bactrim sensitive to nitrofurantoin will prescribe 100 mg twice a day for 5 days repeat urine culture

## 2024-02-26 ENCOUNTER — TELEPHONE (OUTPATIENT)
Dept: PRIMARY CARE | Facility: CLINIC | Age: 74
End: 2024-02-26

## 2024-02-26 ENCOUNTER — LAB (OUTPATIENT)
Dept: LAB | Facility: LAB | Age: 74
End: 2024-02-26
Payer: MEDICARE

## 2024-02-26 DIAGNOSIS — N30.00 ACUTE CYSTITIS WITHOUT HEMATURIA: ICD-10-CM

## 2024-02-26 LAB
APPEARANCE UR: CLEAR
BILIRUB UR STRIP.AUTO-MCNC: NEGATIVE MG/DL
COLOR UR: ABNORMAL
GLUCOSE UR STRIP.AUTO-MCNC: NEGATIVE MG/DL
KETONES UR STRIP.AUTO-MCNC: NEGATIVE MG/DL
LEUKOCYTE ESTERASE UR QL STRIP.AUTO: NEGATIVE
NITRITE UR QL STRIP.AUTO: NEGATIVE
PH UR STRIP.AUTO: 5 [PH]
PROT UR STRIP.AUTO-MCNC: NEGATIVE MG/DL
RBC # UR STRIP.AUTO: NEGATIVE /UL
SP GR UR STRIP.AUTO: 1
UROBILINOGEN UR STRIP.AUTO-MCNC: <2 MG/DL

## 2024-02-26 PROCEDURE — 81003 URINALYSIS AUTO W/O SCOPE: CPT

## 2024-02-26 PROCEDURE — 87086 URINE CULTURE/COLONY COUNT: CPT

## 2024-02-26 NOTE — TELEPHONE ENCOUNTER
----- Message from Buddy Blas DO sent at 2/26/2024  2:13 PM EST -----  Repeat urine culture negative for UTI

## 2024-02-27 LAB — BACTERIA UR CULT: NORMAL

## 2024-03-06 NOTE — CONSULTS
"    Service:   Service: Medicine     Consult:  Consult requested by (Attending Name): Rupal Renee   Reason: Hospitalist/Teaching Service or PCP for Medical  Management     History of Present Illness:   Admission Reason: L TKA   HPI:    CAROL ROLLINS is a 72 year old Female with PMHx s/f DLD, hypothyroidism, GERD, SHE, who was admitted s/p L TKA for primary localized OA. IMS consulted for medical  management. Pt seen eating dinner POD #0. Reports her pain is \"remarkably well-controlled.\" Tells me that she would like to thank Dr. Renee for letting her stay overnight, because she was originally planned to have this as an outpatient procedure, but  was worried she would have difficulty with stairs at home + would be too anxious letting someone else take care of her 95 y/o mother in front of her while she was recovering (pt is her primary caregiver regularly). She is otherwise doing well and denies  complaints such as paresthesias / numbness / tingling, CP, palpitations, SOB, dizziness or lightheadedness, abdominal pain, nausea, vomiting.     Hospital Course:   1. Vitals: Afebrile, BP 100s-150s/70s-80s, HR 50s-70s, 100% RA  2. Pre-op labs s/f mild anemia @ 11.7, mild hyponatremia @ 131 (persistent)  3. COVID negative on 04/04/23     12-point ROS reviewed and found to be negative aside from aforementioned positives in HPI.     PMHx: As above   Surgical Hx: Tubal ligation   Social Hx: Denies tobacco (current / former). Will typically have a glass of wine with dinner a few days per week, but denies daily use or history of abuse. Denies illicit substances. She is the primary caregiver for her mother, and has unfortunately  been  for \"quite a long time.\"  Family Hx: HTN, CVD, multiple cancers on both sides of the family      Review Family/Social History and ROS:   Social History:    Smoking Status: never smoker  (1)            Allergies:  ·  NKDA :   ·  Bee  Stings: Unknown    Objective:     Objective Information:  "       T   P  R  BP   MAP  SpO2   Value  37.3  82  16  106/67      96%  Date/Time 4/5 19:15 4/5 19:15 4/5 19:15 4/5 19:15    4/5 19:15  Range  (36.9C - 37.3C )  (63 - 82 )  (16 - 18 )  (106 - 150 )/ (67 - 85 )    (96% - 98% )  Highest temp of 37.3 C was recorded at 4/5 19:15        Pain reported at 4/5 17:05: 0 = None      Physical Exam by System:    Constitutional: Pleasant and cooperative, appears  slightly younger than stated age. Laying in bed in no acute distress. Conversant.   Eyes: EOMI. Anicteric sclera. Glasses in place.   ENMT: Mucous membranes moist; no obvious injury or  deformity appreciated.   Head/Neck: Normocephalic, atraumatic. ROM preserved.  Trachea midline. No appreciable JVD.   Respiratory/Thorax: Nonlabored on RA. Lungs clear  to auscultation bilaterally without obvious adventitious sounds. Chest rise is equal.   Cardiovascular: RRR. No gross murmur, gallop, or  rub. Extremities are warm and well-perfused with good capillary refill (< 3 seconds). No chest wall tenderness.   Gastrointestinal: Abdomen soft, nontender, nondistended.  No obvious organomegaly appreciated. Bowel sounds are present and normoactive.   Musculoskeletal: L knee post-op wrapping is C/D/I;  distal NVS intact   Extremities: No cyanosis, edema, or clubbing evident.  Neurovascularly intact.   Neurological: A&Ox3. CN 2-12 grossly intact. Able  to respond to all questions appropriately and clearly. No focal neurologic deficits on examination.   Psychological: Appropriate mood and behavior   Skin: Warm and dry; no obvious lesions, rashes, pallor,  or jaundice.     Medications:    Medications:          Continuous Medications       --------------------------------    1. Lactated Ringers Infusion:  1000  mL  IntraVenous  <Continuous>         Scheduled Medications       --------------------------------    1. Acetaminophen:  975  mg  Oral  Every 8 Hours    2. Aspirin Enteric Coated:  81  mg  Oral  2 Times a Day    3. ceFAZolin 2 gram/  D5W 100 mL Premix IVPB:  100  mL  IntraVenous Piggyback  Every 8 Hours    4. Cholecalciferol (Vitamin D3):  1000  International Unit(s)  Oral  Daily    5. Docusate:  100  mg  Oral  2 Times a Day    6. Ferrous Sulfate:  325  mg  Oral  3 Times a Day    7. Levothyroxine:  50  microgram(s)  Oral  Daily    8. Pantoprazole:  20  mg  Oral  Daily    9. Polyethylene Glycol:  17  gram(s)  Oral  2 Times a Day         PRN Medications       --------------------------------    1. Cyclobenzaprine:  10  mg  Oral  3 Times a Day    2. diphenhydrAMINE:  25  mg  Oral  Every 6 Hours    3. Ketorolac Injectable:  15  mg  IntraVenous Push  Every 6 Hours    4. Magnesium Hydroxide -Al Hydrox -Simethicone Oral Liquid:  30  mL  Oral  Every 6 Hours    5. Ondansetron Injectable:  4  mg  IntraVenous Push  Every 6 Hours    6. oxyCODONE Immediate Release:  5  mg  Oral  Every 4 Hours    7. oxyCODONE Immediate Release:  10  mg  Oral  Every 4 Hours           Currently Suspended Medications       --------------------------------    1. Cyanocobalamin:  100  microgram(s)  Oral  Daily        Recent Lab Results:    Results:        I have reviewed these laboratory results: Coronavirus 2019, Screen Asymptomatic [Drawn 04-Apr-2023 07:22:00].      Radiology Results:    Results:    I have reviewed this radiology result:     Xray Knee 1 or 2 View [Apr 5 2023  4:27PM]  Xray Chest 2 View PA + Lateral [Mar 22 2023  6:11PM]  Electrocardiogram 12 Lead [Mar 21 2023  2:41PM]  Xray Knee 1 or 2 View [Feb 7 2023  9:36AM]          Assessment:      71 y/o F with PMHx s/f DLD, hypothyroidism, GERD, SHE, who was admitted s/p L TKA for primary localized OA. IMS consulted for medical management.    Thank you for involving us in the care of this very pleasant lady. We will continue to follow while she remains admitted.      Plan of Care Reviewed With:  Plan of Care Reviewed With: patient     Consult Status:  Consult Status    (select all that apply): initial  consult complete,  "will follow   Consult Order ID: [id number]     Problem/Assessment/Plan:    Impression 1: Primary localized OA of L knee s/p  TKA 04/05/23   Plan for Impression 1: -NVS: intact  -Bandaging: C/D/I  -Pain: controlled  -Bowel regimen  -Diet: tolerating PO  -IS   -Working with PT/OT  -Majority of management deferred to primary service   Impression 2: Hypothyroidism   Plan for Impression 2: -Continued home medications   Impression 3: DLD   Plan for Impression 3: -Does not currently appear  to be on any medications, will follow-up again (med rec was being completed as I was leaving)   Impression 4: GERD   Plan for Impression 4: -Continued PPI   Impression 5: SHE   Plan for Impression 5: -Continued iron supplements   Impression 6: DVT Prophylaxis   Plan for Impression 6: -SCDs  -PT/OT  -Chemoprophylaxis per primary service     Consult Billing - Observation Patients:   Consult Billing Time:  ·  Prep Time on Date of Patient Encounter (minutes): 10 /minutes   ·  Time Directly with Patient/Family/Caregiver (minutes): 20 /minutes   ·  Additional Time on Patient Care Activities (minutes): 5 /minutes   ·  Documentation Time (minutes): 25 /minutes   ·  Total Time (minutes): 60    ·  Time Spent wiith this Patient (minutes).  More than 50% of This Time was Spent in Counseling and/or Coordination of Care: 20 /minutes       Electronic Signatures:  Idris Mart)   (Signed 06-Apr-2023 07:32)   Co-Signer: Service, History of Present Illness, Review Family/Social History and ROS, Allergies, Objective, Assessment/Recommendations, Note Completion, Consult Billing - Observation Patients  Olive Trujillo (PAC)   (Signed 05-Apr-2023 19:44)   Authored: Service, History of Present Illness, Review Family/Social History and ROS, Allergies, Objective, Assessment/Recommendations, Note Completion, Consult Billing - Observation Patients    Last Updated: 06-Apr-2023 07:32 by Idris Mart)    References:  1.  Data Referenced From \"Patient " "Profile - Adult v2\" 05-Apr-2023 17:08  "

## 2024-03-06 NOTE — CONSULTS
Service:   Service: Medicine     Consult:  Consult requested by (Attending Name): Rupal Renee   Reason: Hospitalist/Teaching Service or PCP for Medical  Management     History of Present Illness:   HPI:    CAROL ROLLINS is a 72 year old Female with a PMHx of hypothyroidism who was admitted status post right total hip arthroplasty on 5/31 with Dr. Renee. Patient had  been experiencing hip pain for about 6 months prior to surgery, which was waking her up at night. Weight bearing and stairs exacerbated her pain. The hip pain was limiting her ability to rehab her left knee following surgery in April (left total knee  arthroplasty on 4/5/23 with Dr. Renee). On imaging of the hip she was found to have severe avascular necrosis, and right SARA was promptly scheduled. She denies any issues secondary to the procedure, and is grateful that her nausea has been better controlled  this time compared to last. She does continue to complain of significant right hip pain, but is reassured that this should get better over the next few days. She has worked with PT, and is able to stand and ambulate to the commode herself. She is due  to be discharged home later today.    Patient discharged by Dr. Renee.  Medically okay to be discharged.  See discharge medication list    Past Medical/Surgical History:        Medical History:   Dyslipidemia:    Hypothyroidism:        Surg History:   History of bilateral tubal ligation:    History of total knee arthroplasty:     Review Family/Social History and ROS:   Family History:  Family History:    Family history of CAD, HTN, and breast cancer    Social History:    Smoking Status: never smoker  (1)   Alcohol Use: denies (1)   Drug Use: denies  (1)   Social History:    Lives at home with mother and grandson    Constitutional: NEGATIVE: Fever, Chills, Anorexia,  Weight Loss, Malaise     Eyes: NEGATIVE: Blurry Vision, Drainage, Diploplia,  Redness, Vision Loss/ Change     ENMT: NEGATIVE: Nasal  Discharge, Nasal Congestion,  Ear Pain, Mouth Pain, Throat Pain     Respiratory: NEGATIVE: Dry Cough, Productive Cough,  Hemoptysis, Wheezing, Shortness of Breath     Cardiac: NEGATIVE: Chest Pain, Dyspnea on Exertion,  Orthopnea, Palpitations, Syncope     Gastrointestinal: NEGATIVE: Nausea, Vomiting, Diarrhea,  Constipation, Abdominal Pain     Genitourinary: NEGATIVE: Discharge, Dysuria, Flank  Pain, Frequency, Hematuria     Musculoskeletal: POSITIVE: Decreased ROM, Swelling, Stiffness ; NEGATIVE: Weakness     Neurological: NEGATIVE: Dizziness, Confusion, Headache,  Seizures, Syncope     Psychiatric: NEGATIVE: Mood Changes     Skin: NEGATIVE: Mass, Pain, Pruritus, Rash, Ulcer              Allergies:  ·  NKDA :   ·  Bee  Stings: Unknown    Objective:   Physical Exam by System:    Constitutional: Well developed, awake/alert/oriented  x3, no distress, alert and cooperative, talkative   Eyes: PERRL, EOMI, clear sclera   ENMT: mucous membranes moist, no apparent injury,  no lesions seen   Head/Neck: Neck supple, no apparent injury, thyroid  without mass or tenderness, No JVD, trachea midline   Respiratory/Thorax: Patent airways, CTAB, normal  breath sounds with good chest expansion, thorax symmetric   Cardiovascular: Regular rate and deon, no murmurs,  2+ equal pulses of the extremities, normal S1 and S2   Gastrointestinal: Nondistended, soft, non-tender,  no rebound tenderness or guarding, no masses palpable, no organomegaly   Musculoskeletal: ROM intact, no joint swelling, normal  strength, some tenderness to palpation about the incision over the right hip; well healing surgical scar over the left knee   Extremities: normal extremities, no cyanosis, some  edema in left calf which patient reports is baseline following L TKA, contusions or wounds, no clubbing   Neurological: alert and oriented x3, intact senses,  motor, response and reflexes, normal strength   Psychological: Appropriate mood and behavior   Skin: Warm  and dry, no lesions, no rashes     Medications:    Medications:      CENTRAL NERVOUS SYSTEM AGENTS:    1. Acetaminophen:  975  mg  Oral  Every 8 Hours    2. Aspirin Enteric Coated:  81  mg  Oral  2 Times a Day    3. Ketorolac Injectable:  15  mg  IntraVenous Push  Every 6 Hours   PRN       4. oxyCODONE Immediate Release:  5  mg  Oral  Every 4 Hours   PRN       5. oxyCODONE Immediate Release:  10  mg  Oral  Every 4 Hours   PRN       6. Ondansetron Injectable:  4  mg  IntraVenous Push  Every 6 Hours   PRN       7. Cyclobenzaprine:  10  mg  Oral  3 Times a Day   PRN         GASTROINTESTINAL AGENTS:    1. Magnesium Hydroxide -Al Hydrox -Simethicone Oral Liquid:  30  mL  Oral  Every 6 Hours   PRN       2. Docusate:  100  mg  Oral  2 Times a Day    3. Polyethylene Glycol:  17  gram(s)  Oral  2 Times a Day      NUTRITIONAL PRODUCTS:    1. Lactated Ringers Infusion:  1000  mL  IntraVenous  <Continuous>    2. Lactated Ringers Infusion:  1000  mL  IntraVenous  <Continuous>      RESPIRATORY AGENTS:    1. diphenhydrAMINE:  25  mg  Oral  Every 6 Hours   PRN         Radiology Results:    Results:        Impression:    Status post right total hip arthroplasty.     Xray Pelvis 1 or 2 View [May 31 2023 10:44AM]        Assessment:    Plan    Continue  oxycodone, and cyclobenzaprine as needed for comfort  Continue Zofran as needed for nausea  Continue acetaminophen as needed for comfort  Continue aspirin  Continue diphenhydramine as needed  PT/OT  Plan on discharge home today    Consult Status:  Consult Order ID: [id number]     Problem/Assessment/Plan:    Impression 1: Right hip avascular necrosis, status  post R SARA 5/31 with Dr. Renee   Impression 2: Hypothyroidism   Impression 3: DVT ppx - SCDs     Attestation:   Note Completion:  I am a:  Medical Student/Acting Intern   Medical Student Attestation I, or a resident under my supervision, was present with the medical student who participated in the documentation of this note.  I have  "personally seen and examined the patient and performed the medical decision-making components. I have reviewed the medical student documentation and/or resident documentation and verified the findings in the note as written with additions or exceptions  as stated in the body of this note.    I personally evaluated the patient on 01-Jun-2023   Comments/ Additional Findings    Shared visit with student  Patient seen and examined  Note amended and addended  See my orders for complete plan          Electronic Signatures:  Jelena Rajput ([employer])  (Signed 01-Jun-2023 09:33)   Authored: Service, History of Present Illness, Past Medical/Surgical  History, Review Family/Social History and ROS, Allergies, Objective, Assessment/Recommendations, Note Completion  Idris Mart)  (Signed 01-Jun-2023 18:48)   Authored: History of Present Illness, Review Family/Social  History and ROS, Assessment/Recommendations, Note Completion      Last Updated: 01-Jun-2023 18:48 by Idris Mart (MD)    References:  1.  Data Referenced From \"Patient Profile - Adult v2\" 31-May-2023 14:42   "

## 2024-03-13 ENCOUNTER — APPOINTMENT (OUTPATIENT)
Dept: PRIMARY CARE | Facility: CLINIC | Age: 74
End: 2024-03-13
Payer: MEDICARE

## 2024-03-14 ENCOUNTER — OFFICE VISIT (OUTPATIENT)
Dept: PRIMARY CARE | Facility: CLINIC | Age: 74
End: 2024-03-14
Payer: MEDICARE

## 2024-03-14 VITALS
HEART RATE: 68 BPM | SYSTOLIC BLOOD PRESSURE: 132 MMHG | BODY MASS INDEX: 22.47 KG/M2 | DIASTOLIC BLOOD PRESSURE: 80 MMHG | HEIGHT: 61 IN | WEIGHT: 119 LBS

## 2024-03-14 DIAGNOSIS — G56.02 CARPAL TUNNEL SYNDROME OF LEFT WRIST: ICD-10-CM

## 2024-03-14 DIAGNOSIS — I10 PRIMARY HYPERTENSION: ICD-10-CM

## 2024-03-14 DIAGNOSIS — J44.89 CHRONIC BRONCHITIS WITH COPD (CHRONIC OBSTRUCTIVE PULMONARY DISEASE) (MULTI): Primary | ICD-10-CM

## 2024-03-14 DIAGNOSIS — H35.3131 EARLY DRY STAGE NONEXUDATIVE AGE-RELATED MACULAR DEGENERATION OF BOTH EYES: ICD-10-CM

## 2024-03-14 PROBLEM — F17.200 CURRENT EVERY DAY SMOKER: Status: ACTIVE | Noted: 2024-03-14

## 2024-03-14 PROCEDURE — 1160F RVW MEDS BY RX/DR IN RCRD: CPT | Performed by: INTERNAL MEDICINE

## 2024-03-14 PROCEDURE — 1159F MED LIST DOCD IN RCRD: CPT | Performed by: INTERNAL MEDICINE

## 2024-03-14 PROCEDURE — 99213 OFFICE O/P EST LOW 20 MIN: CPT | Performed by: INTERNAL MEDICINE

## 2024-03-14 PROCEDURE — 3079F DIAST BP 80-89 MM HG: CPT | Performed by: INTERNAL MEDICINE

## 2024-03-14 PROCEDURE — 1036F TOBACCO NON-USER: CPT | Performed by: INTERNAL MEDICINE

## 2024-03-14 PROCEDURE — 3075F SYST BP GE 130 - 139MM HG: CPT | Performed by: INTERNAL MEDICINE

## 2024-03-14 RX ORDER — AMLODIPINE BESYLATE 5 MG/1
5 TABLET ORAL DAILY
Qty: 90 TABLET | Refills: 3 | Status: SHIPPED | OUTPATIENT
Start: 2024-03-14 | End: 2025-03-14

## 2024-03-14 NOTE — ASSESSMENT & PLAN NOTE
Patient notes increased floaters occurring denies headache eye pain or flashes of light or changes in vision has follow-up with ophthalmologist monitors closely with early dry stage macular degeneration stable

## 2024-03-14 NOTE — PROGRESS NOTES
"Subjective   Reason for Visit: Oralia Fam is an 73 y.o. female here for a Medicare Wellness visit.     Past Medical, Surgical, and Family History reviewed and updated in chart.    Reviewed all medications by prescribing practitioner or clinical pharmacist (such as prescriptions, OTCs, herbal therapies and supplements) and documented in the medical record.    Left thumb numbness tingling paresthesias consistent with carpal tunnel syndrome with positive Tinel's and Phalen's testing        Patient Care Team:  Buddy Blas DO as PCP - General (Internal Medicine)  Jl Lara MD as PCP - [employer] PCP     Review of Systems   All other systems reviewed and are negative.      Objective   Vitals:  /80 (BP Location: Left arm, Patient Position: Sitting)   Pulse 68   Ht 1.549 m (5' 1\")   Wt 54 kg (119 lb)   BMI 22.48 kg/m²       Physical Exam  Vitals and nursing note reviewed.   Constitutional:       General: She is not in acute distress.     Appearance: Normal appearance. She is well-developed. She is not toxic-appearing.   HENT:      Head: Normocephalic and atraumatic.      Right Ear: Tympanic membrane and external ear normal.      Left Ear: Tympanic membrane and external ear normal.      Nose: Nose normal.      Mouth/Throat:      Mouth: Mucous membranes are moist.      Pharynx: Oropharynx is clear. No oropharyngeal exudate or posterior oropharyngeal erythema.      Tonsils: No tonsillar exudate. 2+ on the right. 2+ on the left.   Eyes:      Extraocular Movements: Extraocular movements intact.      Conjunctiva/sclera: Conjunctivae normal.   Cardiovascular:      Rate and Rhythm: Normal rate and regular rhythm.      Pulses: Normal pulses.      Heart sounds: Normal heart sounds. No murmur heard.  Pulmonary:      Effort: Pulmonary effort is normal.      Breath sounds: Normal breath sounds.   Abdominal:      General: Abdomen is flat. Bowel sounds are normal.      Palpations: Abdomen is " soft.   Musculoskeletal:      Cervical back: Neck supple.   Lymphadenopathy:      Cervical: No cervical adenopathy.   Skin:     General: Skin is warm and dry.      Findings: No rash.   Neurological:      Mental Status: She is alert. Mental status is at baseline.   Psychiatric:         Mood and Affect: Mood normal.         Behavior: Behavior normal.         Thought Content: Thought content normal.         Judgment: Judgment normal.         Assessment/Plan   Problem List Items Addressed This Visit       Primary hypertension    Current Assessment & Plan     Blood pressure is much improved continue amlodipine 5 mg daily         Relevant Medications    amLODIPine (Norvasc) 5 mg tablet    Early dry stage nonexudative age-related macular degeneration of both eyes    Current Assessment & Plan     Patient notes increased floaters occurring denies headache eye pain or flashes of light or changes in vision has follow-up with ophthalmologist monitors closely with early dry stage macular degeneration stable         Chronic bronchitis with COPD (chronic obstructive pulmonary disease) - Primary    Current Assessment & Plan     Working on smoking cessation to try to quit respiratory symptoms stable at this time reevaluate PFTs optimize medical therapy follow-up on lung cancer screening with history of lung nodules         Relevant Orders    Complete Pulmonary Function Test Pre/Post Bronchodialator (Spirometry Pre/Post/DLCO/Lung Volumes)    Follow Up In [facility] - PCP - Established    Carpal tunnel syndrome of left wrist

## 2024-03-14 NOTE — ASSESSMENT & PLAN NOTE
Working on smoking cessation to try to quit respiratory symptoms stable at this time reevaluate PFTs optimize medical therapy follow-up on lung cancer screening with history of lung nodules

## 2024-03-14 NOTE — PROGRESS NOTES
"Subjective   Patient ID: Oralia Fam is a 73 y.o. female who presents for Follow-up and Hypertension.    HPI     Review of Systems    Objective   Ht 1.549 m (5' 1\")   Wt 54 kg (119 lb)   BMI 22.48 kg/m²     Physical Exam    Assessment/Plan          "

## 2024-03-19 ENCOUNTER — HOSPITAL ENCOUNTER (OUTPATIENT)
Dept: RADIOLOGY | Facility: CLINIC | Age: 74
Discharge: HOME | End: 2024-03-19
Payer: MEDICARE

## 2024-03-19 DIAGNOSIS — I10 PRIMARY HYPERTENSION: ICD-10-CM

## 2024-03-19 DIAGNOSIS — E03.9 ACQUIRED HYPOTHYROIDISM: ICD-10-CM

## 2024-03-19 DIAGNOSIS — Z12.31 SCREENING MAMMOGRAM FOR BREAST CANCER: ICD-10-CM

## 2024-03-19 DIAGNOSIS — K21.9 GASTROESOPHAGEAL REFLUX DISEASE WITHOUT ESOPHAGITIS: ICD-10-CM

## 2024-03-19 DIAGNOSIS — M81.0 POSTMENOPAUSAL BONE LOSS: ICD-10-CM

## 2024-03-19 DIAGNOSIS — R91.8 ABNORMAL CHEST X-RAY WITH MULTIPLE LUNG NODULES: ICD-10-CM

## 2024-03-19 PROCEDURE — 77063 BREAST TOMOSYNTHESIS BI: CPT

## 2024-03-19 PROCEDURE — 77080 DXA BONE DENSITY AXIAL: CPT

## 2024-03-19 PROCEDURE — 77080 DXA BONE DENSITY AXIAL: CPT | Performed by: RADIOLOGY

## 2024-03-19 PROCEDURE — 77067 SCR MAMMO BI INCL CAD: CPT

## 2024-03-19 PROCEDURE — 71250 CT THORAX DX C-: CPT

## 2024-03-19 PROCEDURE — 71250 CT THORAX DX C-: CPT | Performed by: RADIOLOGY

## 2024-03-21 ENCOUNTER — TELEPHONE (OUTPATIENT)
Dept: PRIMARY CARE | Facility: CLINIC | Age: 74
End: 2024-03-21
Payer: MEDICARE

## 2024-03-21 DIAGNOSIS — R91.8 MULTIPLE PULMONARY NODULES DETERMINED BY COMPUTED TOMOGRAPHY OF LUNG: Primary | ICD-10-CM

## 2024-03-21 NOTE — TELEPHONE ENCOUNTER
----- Message from Buddy Blas DO sent at 3/21/2024  3:29 PM EDT -----  Reviewed results of CAT scan in regards to pulmonary nodules stable at this time inflammatory in nature request 6-month follow-up per radiology recommendations  
Patient notified   
no

## 2024-05-28 ENCOUNTER — HOSPITAL ENCOUNTER (OUTPATIENT)
Dept: RADIOLOGY | Facility: CLINIC | Age: 74
Discharge: HOME | End: 2024-05-28
Payer: MEDICARE

## 2024-05-28 ENCOUNTER — OFFICE VISIT (OUTPATIENT)
Dept: ORTHOPEDIC SURGERY | Facility: CLINIC | Age: 74
End: 2024-05-28
Payer: MEDICARE

## 2024-05-28 VITALS — BODY MASS INDEX: 22.47 KG/M2 | WEIGHT: 119 LBS | HEIGHT: 61 IN

## 2024-05-28 DIAGNOSIS — Z96.652 HISTORY OF LEFT KNEE REPLACEMENT: ICD-10-CM

## 2024-05-28 DIAGNOSIS — Z96.641 HISTORY OF RIGHT HIP REPLACEMENT: ICD-10-CM

## 2024-05-28 PROCEDURE — 73560 X-RAY EXAM OF KNEE 1 OR 2: CPT | Mod: LEFT SIDE | Performed by: RADIOLOGY

## 2024-05-28 PROCEDURE — 99213 OFFICE O/P EST LOW 20 MIN: CPT | Performed by: ORTHOPAEDIC SURGERY

## 2024-05-28 PROCEDURE — 1036F TOBACCO NON-USER: CPT | Performed by: ORTHOPAEDIC SURGERY

## 2024-05-28 PROCEDURE — 73502 X-RAY EXAM HIP UNI 2-3 VIEWS: CPT | Mod: RIGHT SIDE | Performed by: RADIOLOGY

## 2024-05-28 PROCEDURE — 73502 X-RAY EXAM HIP UNI 2-3 VIEWS: CPT | Mod: RT

## 2024-05-28 PROCEDURE — 1159F MED LIST DOCD IN RCRD: CPT | Performed by: ORTHOPAEDIC SURGERY

## 2024-05-28 PROCEDURE — 73560 X-RAY EXAM OF KNEE 1 OR 2: CPT | Mod: LT

## 2024-05-28 PROCEDURE — 1160F RVW MEDS BY RX/DR IN RCRD: CPT | Performed by: ORTHOPAEDIC SURGERY

## 2024-05-28 NOTE — PROGRESS NOTES
ORTHOPEDIC TOTAL JOINT  POST-OPERATIVE FOLLOW UP      ============================  IMPRESSION/PLAN:  ============================  73 y.o. female s/p Right Total Hip Replacement completed on 05/31/2023 and Left Total Knee Replacement completed on 4/5/2023.    PLAN:  -Patient is doing excellent at this time with no complaints.  Current x-rays reviewed with her demonstrating stable implants.  She is back to normal activity and continue to work out.  No new intervention necessary at this time.  Will have her follow-up as needed at this point.        Oralia Fam presents today for follow up of joint replacement above. Pain controlled with current treatment plan. Patient has completed physical therapy. They are currently ambulating with  no aide .  Overall very happy in terms of recovery and is back to normal activities.    Review of Systems:   Constitutional: See HPI for pain assessment, No significant weight loss, recent trauma. Denies fevers/chills  Cardiovascular: No chest pain, shortness of breath  Respiratory: No difficulty breathing, cough  Gastrointestinal: No nausea, vomiting, diarrhea, constipation  Musculoskeletal: Noted in HPI, no arthralgias   Integumentary: No rashes, easy bruising, redness   Neurological: no numbness or tingling in extremities, no gait disturbances     Patient Active Problem List   Diagnosis    Dyslipidemia    Hypothyroid    Primary hypertension    BPPV (benign paroxysmal positional vertigo), left    Gastroesophageal reflux disease without esophagitis    Chronic venous insufficiency of lower extremity    History of right hip replacement    History of left knee replacement    Medicare annual wellness visit, subsequent    ACE-inhibitor cough    Early dry stage nonexudative age-related macular degeneration of both eyes    Urge and stress incontinence    Postmenopausal bone loss    Anemia    Screening mammogram for breast cancer    Abnormal chest x-ray with multiple lung nodules    Chronic  bronchitis with COPD (chronic obstructive pulmonary disease) (Multi)    Current every day smoker    Carpal tunnel syndrome of left wrist       =================================  EXAM  =================================  GENERAL: A/Ox3, NAD. Appears healthy, well nourished  CARDIAC: regular rate  LUNGS: Breathing non-labored    MUSCULOSKELETAL:  Laterality: right Hip exam  - Strength: Abduction 5/5, Flexion 5/5  - EHL/PF/DF motor intact  - compartments soft, negative homans  - Gait: using no assistive device    Laterality: left knee  - Incision: Well-healed  - ROM: 0 to 120 degrees  - Palpation: appropriate post operative TTP along surgical incision  - compartments soft, negative homans  - can active straight leg raise with no extensor lag    NEUROVASCULAR:  - Neurovascular Status: sensation intact to light touch distally  - Capillary refill brisk at extremities, Bilateral dorsalis pedis pulse 2+        IMAGING: Multi views of the bilateral hips and left knee reviewed which demonstrate no signs of loosening failure right total hip arthroplasty and left total knee arthroplasty. X-rays were personally reviewed by me.  Radiology reports were reviewed by me as well, if available at the time.        Rupal Renee DO  Attending Surgeon  Joint Replacement and Adult Reconstructive Surgery  [place], [place]

## 2024-05-28 NOTE — LETTER
May 28, 2024     Buddy Blas,   [address]  [place] [address]    Patient: Oralia aFm   YOB: 1950   Date of Visit: 5/28/2024       Dear Dr. Buddy Blas, :    Thank you for referring Oralia Fam to me for evaluation. Below are my notes for this consultation.  If you have questions, please do not hesitate to call me. I look forward to following your patient along with you.       Sincerely,     Rupal Renee DO      CC: No Recipients  ______________________________________________________________________________________    ORTHOPEDIC TOTAL JOINT  POST-OPERATIVE FOLLOW UP      ============================  IMPRESSION/PLAN:  ============================  73 y.o. female s/p Right Total Hip Replacement completed on 05/31/2023 and Left Total Knee Replacement completed on 4/5/2023.    PLAN:  -Patient is doing excellent at this time with no complaints.  Current x-rays reviewed with her demonstrating stable implants.  She is back to normal activity and continue to work out.  No new intervention necessary at this time.  Will have her follow-up as needed at this point.        Oralia Fam presents today for follow up of joint replacement above. Pain controlled with current treatment plan. Patient has completed physical therapy. They are currently ambulating with  no aide .  Overall very happy in terms of recovery and is back to normal activities.    Review of Systems:   Constitutional: See HPI for pain assessment, No significant weight loss, recent trauma. Denies fevers/chills  Cardiovascular: No chest pain, shortness of breath  Respiratory: No difficulty breathing, cough  Gastrointestinal: No nausea, vomiting, diarrhea, constipation  Musculoskeletal: Noted in HPI, no arthralgias   Integumentary: No rashes, easy bruising, redness   Neurological: no numbness or tingling in extremities, no gait disturbances     Patient Active Problem List    Diagnosis   • Dyslipidemia   • Hypothyroid   • Primary hypertension   • BPPV (benign paroxysmal positional vertigo), left   • Gastroesophageal reflux disease without esophagitis   • Chronic venous insufficiency of lower extremity   • History of right hip replacement   • History of left knee replacement   • Medicare annual wellness visit, subsequent   • ACE-inhibitor cough   • Early dry stage nonexudative age-related macular degeneration of both eyes   • Urge and stress incontinence   • Postmenopausal bone loss   • Anemia   • Screening mammogram for breast cancer   • Abnormal chest x-ray with multiple lung nodules   • Chronic bronchitis with COPD (chronic obstructive pulmonary disease) (Multi)   • Current every day smoker   • Carpal tunnel syndrome of left wrist       =================================  EXAM  =================================  GENERAL: A/Ox3, NAD. Appears healthy, well nourished  CARDIAC: regular rate  LUNGS: Breathing non-labored    MUSCULOSKELETAL:  Laterality: right Hip exam  - Strength: Abduction 5/5, Flexion 5/5  - EHL/PF/DF motor intact  - compartments soft, negative homans  - Gait: using no assistive device    Laterality: left knee  - Incision: Well-healed  - ROM: 0 to 120 degrees  - Palpation: appropriate post operative TTP along surgical incision  - compartments soft, negative homans  - can active straight leg raise with no extensor lag    NEUROVASCULAR:  - Neurovascular Status: sensation intact to light touch distally  - Capillary refill brisk at extremities, Bilateral dorsalis pedis pulse 2+        IMAGING: Multi views of the bilateral hips and left knee reviewed which demonstrate no signs of loosening failure right total hip arthroplasty and left total knee arthroplasty. X-rays were personally reviewed by me.  Radiology reports were reviewed by me as well, if available at the time.        Rupal Renee, DO  Attending Surgeon  [facility] Re[facility] Adult Re[facility]ive  [place], [place]

## 2024-07-23 ENCOUNTER — TELEPHONE (OUTPATIENT)
Dept: PRIMARY CARE | Facility: CLINIC | Age: 74
End: 2024-07-23
Payer: MEDICARE

## 2024-07-23 DIAGNOSIS — E78.5 DYSLIPIDEMIA: ICD-10-CM

## 2024-07-23 DIAGNOSIS — I10 PRIMARY HYPERTENSION: Primary | ICD-10-CM

## 2024-07-23 DIAGNOSIS — E03.9 ACQUIRED HYPOTHYROIDISM: ICD-10-CM

## 2024-07-23 RX ORDER — HYDROCHLOROTHIAZIDE 25 MG/1
25 TABLET ORAL DAILY
Qty: 90 TABLET | Refills: 3 | Status: SHIPPED | OUTPATIENT
Start: 2024-07-23 | End: 2025-07-23

## 2024-07-23 NOTE — TELEPHONE ENCOUNTER
Patient is concerned about her BP readings between her left and right arm. On left arm on 7/18 it was 165/79 and on 7/22 left arm 157/78. She checked her BP on 7/23 on left arm 150/79 and on right arm 138/80. Should she be concerned about the reading difference.

## 2024-08-26 ENCOUNTER — TELEPHONE (OUTPATIENT)
Dept: PRIMARY CARE | Facility: CLINIC | Age: 74
End: 2024-08-26
Payer: MEDICARE

## 2024-08-26 DIAGNOSIS — E03.9 ACQUIRED HYPOTHYROIDISM: ICD-10-CM

## 2024-08-26 RX ORDER — LEVOTHYROXINE SODIUM 50 UG/1
50 TABLET ORAL DAILY
Qty: 90 TABLET | Refills: 3 | Status: SHIPPED | OUTPATIENT
Start: 2024-08-26

## 2024-09-16 ENCOUNTER — APPOINTMENT (OUTPATIENT)
Dept: PRIMARY CARE | Facility: CLINIC | Age: 74
End: 2024-09-16
Payer: MEDICARE

## 2024-09-16 VITALS
SYSTOLIC BLOOD PRESSURE: 118 MMHG | BODY MASS INDEX: 22.47 KG/M2 | HEART RATE: 60 BPM | HEIGHT: 61 IN | WEIGHT: 119 LBS | DIASTOLIC BLOOD PRESSURE: 80 MMHG

## 2024-09-16 DIAGNOSIS — E78.5 DYSLIPIDEMIA: ICD-10-CM

## 2024-09-16 DIAGNOSIS — E03.9 ACQUIRED HYPOTHYROIDISM: ICD-10-CM

## 2024-09-16 DIAGNOSIS — I10 PRIMARY HYPERTENSION: ICD-10-CM

## 2024-09-16 DIAGNOSIS — M81.0 AGE-RELATED OSTEOPOROSIS WITHOUT CURRENT PATHOLOGICAL FRACTURE: Primary | ICD-10-CM

## 2024-09-16 DIAGNOSIS — J44.89 CHRONIC BRONCHITIS WITH COPD (CHRONIC OBSTRUCTIVE PULMONARY DISEASE) (MULTI): ICD-10-CM

## 2024-09-16 DIAGNOSIS — E55.9 VITAMIN D DEFICIENCY: ICD-10-CM

## 2024-09-16 DIAGNOSIS — R91.8 ABNORMAL CHEST X-RAY WITH MULTIPLE LUNG NODULES: ICD-10-CM

## 2024-09-16 DIAGNOSIS — D51.9 ANEMIA DUE TO VITAMIN B12 DEFICIENCY, UNSPECIFIED B12 DEFICIENCY TYPE: ICD-10-CM

## 2024-09-16 PROCEDURE — 99214 OFFICE O/P EST MOD 30 MIN: CPT | Performed by: INTERNAL MEDICINE

## 2024-09-16 PROCEDURE — 1160F RVW MEDS BY RX/DR IN RCRD: CPT | Performed by: INTERNAL MEDICINE

## 2024-09-16 PROCEDURE — 3074F SYST BP LT 130 MM HG: CPT | Performed by: INTERNAL MEDICINE

## 2024-09-16 PROCEDURE — 3079F DIAST BP 80-89 MM HG: CPT | Performed by: INTERNAL MEDICINE

## 2024-09-16 PROCEDURE — 1159F MED LIST DOCD IN RCRD: CPT | Performed by: INTERNAL MEDICINE

## 2024-09-16 PROCEDURE — 3008F BODY MASS INDEX DOCD: CPT | Performed by: INTERNAL MEDICINE

## 2024-09-16 PROCEDURE — 1036F TOBACCO NON-USER: CPT | Performed by: INTERNAL MEDICINE

## 2024-09-16 RX ORDER — ALENDRONATE SODIUM 70 MG/1
70 TABLET ORAL
Qty: 4 TABLET | Refills: 11 | Status: SHIPPED | OUTPATIENT
Start: 2024-09-16 | End: 2025-09-16

## 2024-09-16 NOTE — PROGRESS NOTES
"Subjective   Patient ID: Oralia Fam is a 73 y.o. female who presents for Follow-up.    HPI     Review of Systems    Objective   /80   Pulse 60   Ht 1.549 m (5' 1\")   Wt 54 kg (119 lb)   BMI 22.48 kg/m²     Physical Exam    Assessment/Plan          "

## 2024-09-16 NOTE — ASSESSMENT & PLAN NOTE
Stable respiratory status at this time follow-up on results of PFTs and continue lung cancer screening  Orders:    Follow Up In [facility] - PCP - Established    Follow Up In [facility] - PCP - Medicare Annual; Future

## 2024-09-20 NOTE — ASSESSMENT & PLAN NOTE
Continue vitamin supplementation with 100 mcg of vitamin B12  Orders:    Vitamin B12; Future    CBC and Auto Differential; Future    Iron and TIBC; Future

## 2024-09-20 NOTE — ASSESSMENT & PLAN NOTE
Blood pressure stabilized on hydrochlorothiazide 25 mg daily with amlodipine 5 mg daily continue reevaluate blood work

## 2024-09-20 NOTE — ASSESSMENT & PLAN NOTE
Continue alendronate 70 mg weekly check calcium and vitamin D levels with next visit follow-up with bone density in 2 years  Orders:    alendronate (Fosamax) 70 mg tablet; Take 1 tablet (70 mg) by mouth every 7 days. Take in the morning with a full glass of water, on an empty stomach, and do not take anything else by mouth or lie down for the next 30 min.    Follow Up In [facility] - PCP - Medicare Annual; Future

## 2024-09-20 NOTE — ASSESSMENT & PLAN NOTE
Scheduled follow-up with CT chest without IV contrast follow-up on lung nodules  Orders:    CT chest wo IV contrast; Future

## 2024-10-14 ENCOUNTER — HOSPITAL ENCOUNTER (OUTPATIENT)
Dept: RADIOLOGY | Facility: HOSPITAL | Age: 74
Discharge: HOME | End: 2024-10-14
Payer: MEDICARE

## 2024-10-14 DIAGNOSIS — R91.8 ABNORMAL CHEST X-RAY WITH MULTIPLE LUNG NODULES: ICD-10-CM

## 2024-10-14 PROCEDURE — 71250 CT THORAX DX C-: CPT | Performed by: RADIOLOGY

## 2024-10-14 PROCEDURE — 71250 CT THORAX DX C-: CPT

## 2024-10-18 ENCOUNTER — TELEPHONE (OUTPATIENT)
Dept: PRIMARY CARE | Facility: CLINIC | Age: 74
End: 2024-10-18
Payer: MEDICARE

## 2024-10-18 NOTE — TELEPHONE ENCOUNTER
----- Message from Buddy Blas sent at 10/18/2024  5:01 PM EDT -----  Results of the CAT scan of chest for pulmonary nodules is unchanged and remains the same and stable we will reevaluate in 1 year

## 2025-03-11 ENCOUNTER — TELEPHONE (OUTPATIENT)
Dept: PRIMARY CARE | Facility: CLINIC | Age: 75
End: 2025-03-11
Payer: MEDICARE

## 2025-03-11 DIAGNOSIS — I10 PRIMARY HYPERTENSION: ICD-10-CM

## 2025-03-11 RX ORDER — AMLODIPINE BESYLATE 5 MG/1
5 TABLET ORAL DAILY
Qty: 90 TABLET | Refills: 3 | Status: SHIPPED | OUTPATIENT
Start: 2025-03-11 | End: 2026-03-11

## 2025-03-11 NOTE — TELEPHONE ENCOUNTER
Patient needs a refill  Sched 3/17    amLODIPine (Norvasc) 5 mg tablet [[id number]]    Order Details  Dose: 5 mg Route: oral Frequency: Daily   Dispense Quantity: 90 tablet Refills: 3          Sig: Take 1 tablet (5 mg) by mouth once daily.         Start Date: 03/14/24 End Date: 03/14/25 after 365 doses   Written Date: 03/14/24 Rx Expiration Date: 03/14/25        Associated Diagnoses: Primary hypertension [I10]   Original Order: amLODIPine (Norvasc) 5 mg tablet [[id number]]   Providers  Pharmacy    [id number] [address]

## 2025-03-17 ENCOUNTER — APPOINTMENT (OUTPATIENT)
Dept: PRIMARY CARE | Facility: CLINIC | Age: 75
End: 2025-03-17
Payer: MEDICARE

## 2025-03-17 VITALS
HEART RATE: 66 BPM | DIASTOLIC BLOOD PRESSURE: 78 MMHG | SYSTOLIC BLOOD PRESSURE: 128 MMHG | HEIGHT: 61 IN | WEIGHT: 130 LBS | BODY MASS INDEX: 24.55 KG/M2

## 2025-03-17 DIAGNOSIS — I10 PRIMARY HYPERTENSION: ICD-10-CM

## 2025-03-17 DIAGNOSIS — M81.0 AGE-RELATED OSTEOPOROSIS WITHOUT CURRENT PATHOLOGICAL FRACTURE: ICD-10-CM

## 2025-03-17 DIAGNOSIS — J44.89 CHRONIC BRONCHITIS WITH COPD (CHRONIC OBSTRUCTIVE PULMONARY DISEASE) (MULTI): ICD-10-CM

## 2025-03-17 DIAGNOSIS — Z00.00 MEDICARE ANNUAL WELLNESS VISIT, SUBSEQUENT: Primary | ICD-10-CM

## 2025-03-17 DIAGNOSIS — E03.9 ACQUIRED HYPOTHYROIDISM: ICD-10-CM

## 2025-03-17 DIAGNOSIS — Z12.31 SCREENING MAMMOGRAM FOR BREAST CANCER: ICD-10-CM

## 2025-03-17 PROBLEM — D64.9 ANEMIA: Status: RESOLVED | Noted: 2024-02-14 | Resolved: 2025-03-17

## 2025-03-17 PROBLEM — F17.200 CURRENT EVERY DAY SMOKER: Status: RESOLVED | Noted: 2024-03-14 | Resolved: 2025-03-17

## 2025-03-17 PROCEDURE — 1160F RVW MEDS BY RX/DR IN RCRD: CPT | Performed by: INTERNAL MEDICINE

## 2025-03-17 PROCEDURE — 3078F DIAST BP <80 MM HG: CPT | Performed by: INTERNAL MEDICINE

## 2025-03-17 PROCEDURE — 99213 OFFICE O/P EST LOW 20 MIN: CPT | Performed by: INTERNAL MEDICINE

## 2025-03-17 PROCEDURE — 3008F BODY MASS INDEX DOCD: CPT | Performed by: INTERNAL MEDICINE

## 2025-03-17 PROCEDURE — 3074F SYST BP LT 130 MM HG: CPT | Performed by: INTERNAL MEDICINE

## 2025-03-17 PROCEDURE — 1123F ACP DISCUSS/DSCN MKR DOCD: CPT | Performed by: INTERNAL MEDICINE

## 2025-03-17 PROCEDURE — 1158F ADVNC CARE PLAN TLK DOCD: CPT | Performed by: INTERNAL MEDICINE

## 2025-03-17 PROCEDURE — G2211 COMPLEX E/M VISIT ADD ON: HCPCS | Performed by: INTERNAL MEDICINE

## 2025-03-17 PROCEDURE — 1159F MED LIST DOCD IN RCRD: CPT | Performed by: INTERNAL MEDICINE

## 2025-03-17 PROCEDURE — 1170F FXNL STATUS ASSESSED: CPT | Performed by: INTERNAL MEDICINE

## 2025-03-17 PROCEDURE — 1036F TOBACCO NON-USER: CPT | Performed by: INTERNAL MEDICINE

## 2025-03-17 ASSESSMENT — ANXIETY QUESTIONNAIRES
6. BECOMING EASILY ANNOYED OR IRRITABLE: NOT AT ALL
7. FEELING AFRAID AS IF SOMETHING AWFUL MIGHT HAPPEN: NOT AT ALL
IF YOU CHECKED OFF ANY PROBLEMS ON THIS QUESTIONNAIRE, HOW DIFFICULT HAVE THESE PROBLEMS MADE IT FOR YOU TO DO YOUR WORK, TAKE CARE OF THINGS AT HOME, OR GET ALONG WITH OTHER PEOPLE: NOT DIFFICULT AT ALL
1. FEELING NERVOUS, ANXIOUS, OR ON EDGE: NOT AT ALL
2. NOT BEING ABLE TO STOP OR CONTROL WORRYING: NOT AT ALL
3. WORRYING TOO MUCH ABOUT DIFFERENT THINGS: NOT AT ALL
5. BEING SO RESTLESS THAT IT IS HARD TO SIT STILL: NOT AT ALL
GAD7 TOTAL SCORE: 0
4. TROUBLE RELAXING: NOT AT ALL

## 2025-03-17 ASSESSMENT — ACTIVITIES OF DAILY LIVING (ADL)
DRESSING: INDEPENDENT
BATHING: INDEPENDENT
MANAGING_FINANCES: INDEPENDENT
GROCERY_SHOPPING: INDEPENDENT
DOING_HOUSEWORK: INDEPENDENT
TAKING_MEDICATION: INDEPENDENT

## 2025-03-17 ASSESSMENT — COLUMBIA-SUICIDE SEVERITY RATING SCALE - C-SSRS
1. IN THE PAST MONTH, HAVE YOU WISHED YOU WERE DEAD OR WISHED YOU COULD GO TO SLEEP AND NOT WAKE UP?: NO
2. HAVE YOU ACTUALLY HAD ANY THOUGHTS OF KILLING YOURSELF?: NO
6. HAVE YOU EVER DONE ANYTHING, STARTED TO DO ANYTHING, OR PREPARED TO DO ANYTHING TO END YOUR LIFE?: NO

## 2025-03-17 ASSESSMENT — ENCOUNTER SYMPTOMS
LOSS OF SENSATION IN FEET: 0
OCCASIONAL FEELINGS OF UNSTEADINESS: 0
DEPRESSION: 0

## 2025-03-17 NOTE — ASSESSMENT & PLAN NOTE
Discussed advanced medical directives with grandibeth Lucio [facility] medical power of  upon the passing of her daughter Tawana and her mother.  Up-to-date with vaccination screenings schedule screening mammogram for this year encourage patient to consider Shingrix vaccine RSV vaccine and COVID-19 vaccine at pharmacy including Tdap vaccine reevaluate at next visit

## 2025-03-17 NOTE — ASSESSMENT & PLAN NOTE
Annual for screening mammogram completed  Orders:    BI mammo bilateral screening tomosynthesis; Future

## 2025-03-17 NOTE — ASSESSMENT & PLAN NOTE
Blood pressure stable on amlodipine 5 mg daily hydrochlorothiazide 25 mg daily discontinued lisinopril due to cough

## 2025-03-17 NOTE — PROGRESS NOTES
"Subjective   Reason for Visit: Oralia Fam is an 74 y.o. female here for a Medicare Wellness visit.          Reviewed all medications by prescribing practitioner or clinical pharmacist (such as prescriptions, OTCs, herbal therapies and supplements) and documented in the medical record.    HPI    Patient Care Team:  Buddy Blas DO as PCP - General (Internal Medicine)  Ailyn Love MD as PCP - [employer] PCP     Review of Systems    Objective   Vitals:  /78   Pulse 66   Ht 1.549 m (5' 1\")   Wt 59 kg (130 lb)   BMI 24.56 kg/m²       Physical Exam    Assessment & Plan  Chronic bronchitis with COPD (chronic obstructive pulmonary disease) (Multi)    Orders:    Follow Up In [facility] - PCP - Medicare Annual    Age-related osteoporosis without current pathological fracture    Orders:    Follow Up In [facility] - PCP - Medicare Annual              "

## 2025-03-17 NOTE — PROGRESS NOTES
"Subjective   Reason for Visit: Oralia Fam is an 74 y.o. female here for a Medicare Wellness visit.     Past Medical, Surgical, and Family History reviewed and updated in chart.    Reviewed all medications by prescribing practitioner or clinical pharmacist (such as prescriptions, OTCs, herbal therapies and supplements) and documented in the medical record.    HPI    Patient Care Team:  Buddy Blas DO as PCP - General (Internal Medicine)  Ailyn Love MD as PCP - [employer] PCP     Review of Systems   All other systems reviewed and are negative.      Objective   Vitals:  /78   Pulse 66   Ht 1.549 m (5' 1\")   Wt 59 kg (130 lb)   BMI 24.56 kg/m²       Physical Exam  Vitals and nursing note reviewed.   Constitutional:       General: She is not in acute distress.     Appearance: Normal appearance. She is well-developed. She is not toxic-appearing.   HENT:      Head: Normocephalic and atraumatic.      Right Ear: Tympanic membrane and external ear normal.      Left Ear: Tympanic membrane and external ear normal.      Nose: Nose normal.      Mouth/Throat:      Mouth: Mucous membranes are moist.      Pharynx: Oropharynx is clear. No oropharyngeal exudate or posterior oropharyngeal erythema.      Tonsils: No tonsillar exudate. 2+ on the right. 2+ on the left.   Eyes:      Extraocular Movements: Extraocular movements intact.      Conjunctiva/sclera: Conjunctivae normal.   Cardiovascular:      Rate and Rhythm: Normal rate and regular rhythm.      Pulses: Normal pulses.      Heart sounds: Normal heart sounds. No murmur heard.  Pulmonary:      Effort: Pulmonary effort is normal.      Breath sounds: Normal breath sounds.   Abdominal:      General: Abdomen is flat. Bowel sounds are normal.      Palpations: Abdomen is soft.   Musculoskeletal:      Cervical back: Neck supple.   Lymphadenopathy:      Cervical: No cervical adenopathy.   Skin:     General: Skin is warm and dry.      Findings: No " rash.   Neurological:      Mental Status: She is alert. Mental status is at baseline.   Psychiatric:         Mood and Affect: Mood normal.         Behavior: Behavior normal.         Thought Content: Thought content normal.         Judgment: Judgment normal.         Assessment & Plan  Chronic bronchitis with COPD (chronic obstructive pulmonary disease) (Multi)  Non-smoker but exposed to significant secondhand smoke with pulmonary nodules respiratory status stable not requiring extensive use of bronchodilator treatment continue with lung screening for lung nodules in October  Orders:    Follow Up In [facility] - [facility] - Medicare Annual    Follow Up In [facility]  PCP - [facility]; Future    Age-related osteoporosis without current pathological fracture  Patient not taking alendronate due to concerns about interaction with taking levothyroxine since she is taking since a teenager.  I encouraged her to take the alendronate at another time does not have to be in the morning that complex with her taking of her levothyroxine gave a copy of her bone density completed last year indicating significant osteoporosis involving her hip and spine patient notes early menopause at age 51 was not given HRT at the time continues to stay active highly encouraged to restart alendronate 70 mg weekly  Orders:    Follow Up In [facility] - PCP - Medicare Annual    Follow Up In [facility]  PCP [facility]; Future    Medicare annual wellness visit, subsequent  Discussed advanced medical directives with layo Lucio cancer medical power of  upon the passing of her daughter Tawana and her mother.  Up-to-date with vaccination screenings schedule screening mammogram for this year encourage patient to consider Shingrix vaccine RSV vaccine and COVID-19 vaccine at pharmacy including Tdap vaccine reevaluate at next visit       Acquired hypothyroidism  Clinically euthyroid continue  levothyroxine 50 mcg daily reevaluate next visit  Orders:    Follow Up In [facility] - PCP - Established; Future    Screening mammogram for breast cancer  Annual for screening mammogram completed  Orders:    BI mammo bilateral screening tomosynthesis; Future    Primary hypertension  Blood pressure stable on amlodipine 5 mg daily hydrochlorothiazide 25 mg daily discontinued lisinopril due to cough              Advance Directives Discussion  16 - 20 minutes were spent discussing Advanced Care Planning (including a Living Will, Medical Power Of , as well as specific end of life choices and/or directives). The details of that discussion were documented in Advanced Directives Discussion section of the medical record.     Cardiac Risk Assessment  15 - 20 minutes were spent discussing Cardiovascular risk and, if needed, lifestyle modifications were recommended, including nutritional choices, exercise, and elimination of habits contributing to risk.   Aspirin use/disuse was discussed following the guidelines below:ASCVD RISK16.9%  low dose ASA ( mg) should be considered:    If prior Heart Attack/Stroke/Peripheral vascular disease:  Generally recommend daily low dose aspirin unless extremely high bleeding risk (e.g., gastrointestinal).    If no prior Heart Attack/Stroke/Peripheral vascular disease:              Age over 70: Do not use Aspirin for prevention    Age less than 70 and 10-year cardiovascular disease risk is >20%: use low dose Aspirin for prevention.                 Depression Screening  5 - 10 minutes were spent screening for depression.

## 2025-03-17 NOTE — ASSESSMENT & PLAN NOTE
Clinically euthyroid continue levothyroxine 50 mcg daily reevaluate next visit  Orders:    Follow Up In [facility] - PCP - Established; Future

## 2025-03-17 NOTE — ASSESSMENT & PLAN NOTE
Patient not taking alendronate due to concerns about interaction with taking levothyroxine since she is taking since a teenager.  I encouraged her to take the alendronate at another time does not have to be in the morning that complex with her taking of her levothyroxine gave a copy of her bone density completed last year indicating significant osteoporosis involving her hip and spine patient notes early menopause at age 51 was not given HRT at the time continues to stay active highly encouraged to restart alendronate 70 mg weekly  Orders:    Follow Up In [facility] - PCP - Medicare Annual    Follow Up In [facility] - PCP - Established; Future

## 2025-03-17 NOTE — ASSESSMENT & PLAN NOTE
Non-smoker but exposed to significant secondhand smoke with pulmonary nodules respiratory status stable not requiring extensive use of bronchodilator treatment continue with lung screening for lung nodules in October  Orders:    Follow Up In [facility] - PCP - Medicare Annual    Follow Up In [facility] - PCP - Established; Future

## 2025-03-18 LAB
ALBUMIN SERPL-MCNC: 4.4 G/DL (ref 3.6–5.1)
ALP SERPL-CCNC: 59 U/L (ref 37–153)
ALT SERPL-CCNC: 16 U/L (ref 6–29)
ANION GAP SERPL CALCULATED.4IONS-SCNC: 7 MMOL/L (CALC) (ref 7–17)
AST SERPL-CCNC: 18 U/L (ref 10–35)
BILIRUB SERPL-MCNC: 0.4 MG/DL (ref 0.2–1.2)
BUN SERPL-MCNC: 13 MG/DL (ref 7–25)
CALCIUM SERPL-MCNC: 9.2 MG/DL (ref 8.6–10.4)
CHLORIDE SERPL-SCNC: 103 MMOL/L (ref 98–110)
CHOLEST SERPL-MCNC: 240 MG/DL
CHOLEST/HDLC SERPL: 3 (CALC)
CO2 SERPL-SCNC: 27 MMOL/L (ref 20–32)
CREAT SERPL-MCNC: 0.75 MG/DL (ref 0.6–1)
EGFRCR SERPLBLD CKD-EPI 2021: 84 ML/MIN/1.73M2
GLUCOSE SERPL-MCNC: 91 MG/DL (ref 65–99)
HDLC SERPL-MCNC: 81 MG/DL
LDLC SERPL CALC-MCNC: 142 MG/DL (CALC)
NONHDLC SERPL-MCNC: 159 MG/DL (CALC)
POTASSIUM SERPL-SCNC: 4.2 MMOL/L (ref 3.5–5.3)
PROT SERPL-MCNC: 7 G/DL (ref 6.1–8.1)
SODIUM SERPL-SCNC: 137 MMOL/L (ref 135–146)
TRIGL SERPL-MCNC: 77 MG/DL
TSH SERPL-ACNC: 0.72 MIU/L (ref 0.4–4.5)

## 2025-08-20 ENCOUNTER — TELEPHONE (OUTPATIENT)
Dept: PRIMARY CARE | Facility: CLINIC | Age: 75
End: 2025-08-20
Payer: MEDICARE

## 2025-08-20 DIAGNOSIS — E03.9 ACQUIRED HYPOTHYROIDISM: ICD-10-CM

## 2025-08-20 RX ORDER — LEVOTHYROXINE SODIUM 50 UG/1
50 TABLET ORAL DAILY
Qty: 90 TABLET | Refills: 3 | Status: SHIPPED | OUTPATIENT
Start: 2025-08-20

## 2025-09-22 ENCOUNTER — APPOINTMENT (OUTPATIENT)
Dept: PRIMARY CARE | Facility: CLINIC | Age: 75
End: 2025-09-22
Payer: MEDICARE